# Patient Record
Sex: FEMALE | Race: WHITE | NOT HISPANIC OR LATINO | Employment: FULL TIME | URBAN - NONMETROPOLITAN AREA
[De-identification: names, ages, dates, MRNs, and addresses within clinical notes are randomized per-mention and may not be internally consistent; named-entity substitution may affect disease eponyms.]

---

## 2018-03-09 ENCOUNTER — TRANSFERRED RECORDS (OUTPATIENT)
Dept: HEALTH INFORMATION MANAGEMENT | Facility: OTHER | Age: 33
End: 2018-03-09

## 2018-03-09 LAB
HPV ABSTRACT: NORMAL
PAP-ABSTRACT: NORMAL

## 2020-05-12 ENCOUNTER — VIRTUAL VISIT (OUTPATIENT)
Dept: INTERNAL MEDICINE | Facility: OTHER | Age: 35
End: 2020-05-12
Attending: NURSE PRACTITIONER
Payer: COMMERCIAL

## 2020-05-12 VITALS — WEIGHT: 205 LBS

## 2020-05-12 DIAGNOSIS — L70.0 CYSTIC ACNE: ICD-10-CM

## 2020-05-12 DIAGNOSIS — E66.9 OBESITY WITHOUT SERIOUS COMORBIDITY, UNSPECIFIED CLASSIFICATION, UNSPECIFIED OBESITY TYPE: ICD-10-CM

## 2020-05-12 DIAGNOSIS — Z76.89 ENCOUNTER TO ESTABLISH CARE: Primary | ICD-10-CM

## 2020-05-12 DIAGNOSIS — Z76.0 ENCOUNTER FOR MEDICATION REFILL: ICD-10-CM

## 2020-05-12 PROCEDURE — 99214 OFFICE O/P EST MOD 30 MIN: CPT | Mod: 95 | Performed by: NURSE PRACTITIONER

## 2020-05-12 RX ORDER — PHENTERMINE HYDROCHLORIDE 37.5 MG/1
37.5 CAPSULE ORAL EVERY MORNING
Qty: 90 CAPSULE | Refills: 0 | Status: SHIPPED | OUTPATIENT
Start: 2020-05-12 | End: 2020-08-26

## 2020-05-12 RX ORDER — CLINDAMYCIN PHOSPHATE, BENZOYL PEROXIDE 25; 10 MG/G; MG/G
GEL TOPICAL DAILY
Qty: 50 G | Refills: 0 | Status: SHIPPED | OUTPATIENT
Start: 2020-05-12 | End: 2020-08-26

## 2020-05-12 SDOH — SOCIAL STABILITY: SOCIAL NETWORK: ARE YOU MARRIED, WIDOWED, DIVORCED, SEPARATED, NEVER MARRIED, OR LIVING WITH A PARTNER?: MARRIED

## 2020-05-12 SDOH — SOCIAL STABILITY: SOCIAL NETWORK: IN A TYPICAL WEEK, HOW MANY TIMES DO YOU TALK ON THE PHONE WITH FAMILY, FRIENDS, OR NEIGHBORS?: TWICE A WEEK

## 2020-05-12 SDOH — HEALTH STABILITY: MENTAL HEALTH: HOW MANY STANDARD DRINKS CONTAINING ALCOHOL DO YOU HAVE ON A TYPICAL DAY?: 1 OR 2

## 2020-05-12 SDOH — SOCIAL STABILITY: SOCIAL INSECURITY: WITHIN THE LAST YEAR, HAVE YOU BEEN HUMILIATED OR EMOTIONALLY ABUSED IN OTHER WAYS BY YOUR PARTNER OR EX-PARTNER?: NO

## 2020-05-12 SDOH — HEALTH STABILITY: PHYSICAL HEALTH: ON AVERAGE, HOW MANY DAYS PER WEEK DO YOU ENGAGE IN MODERATE TO STRENUOUS EXERCISE (LIKE A BRISK WALK)?: 7 DAYS

## 2020-05-12 SDOH — HEALTH STABILITY: MENTAL HEALTH: HOW OFTEN DO YOU HAVE A DRINK CONTAINING ALCOHOL?: MONTHLY OR LESS

## 2020-05-12 SDOH — SOCIAL STABILITY: SOCIAL NETWORK: HOW OFTEN DO YOU GET TOGETHER WITH FRIENDS OR RELATIVES?: TWICE A WEEK

## 2020-05-12 SDOH — HEALTH STABILITY: MENTAL HEALTH: HOW OFTEN DO YOU HAVE 6 OR MORE DRINKS ON ONE OCCASION?: NEVER

## 2020-05-12 SDOH — SOCIAL STABILITY: SOCIAL INSECURITY
WITHIN THE LAST YEAR, HAVE TO BEEN RAPED OR FORCED TO HAVE ANY KIND OF SEXUAL ACTIVITY BY YOUR PARTNER OR EX-PARTNER?: NO

## 2020-05-12 SDOH — SOCIAL STABILITY: SOCIAL NETWORK
DO YOU BELONG TO ANY CLUBS OR ORGANIZATIONS SUCH AS CHURCH GROUPS UNIONS, FRATERNAL OR ATHLETIC GROUPS, OR SCHOOL GROUPS?: NO

## 2020-05-12 SDOH — SOCIAL STABILITY: SOCIAL INSECURITY: WITHIN THE LAST YEAR, HAVE YOU BEEN AFRAID OF YOUR PARTNER OR EX-PARTNER?: NO

## 2020-05-12 SDOH — SOCIAL STABILITY: SOCIAL NETWORK: HOW OFTEN DO YOU ATTENT MEETINGS OF THE CLUB OR ORGANIZATION YOU BELONG TO?: NOT ASKED

## 2020-05-12 SDOH — SOCIAL STABILITY: SOCIAL NETWORK: HOW OFTEN DO YOU ATTEND CHURCH OR RELIGIOUS SERVICES?: 1 TO 4 TIMES PER YEAR

## 2020-05-12 SDOH — SOCIAL STABILITY: SOCIAL INSECURITY
WITHIN THE LAST YEAR, HAVE YOU BEEN KICKED, HIT, SLAPPED, OR OTHERWISE PHYSICALLY HURT BY YOUR PARTNER OR EX-PARTNER?: NO

## 2020-05-12 SDOH — HEALTH STABILITY: PHYSICAL HEALTH: ON AVERAGE, HOW MANY MINUTES DO YOU ENGAGE IN EXERCISE AT THIS LEVEL?: 30 MIN

## 2020-05-12 ASSESSMENT — PAIN SCALES - GENERAL: PAINLEVEL: NO PAIN (0)

## 2020-05-12 NOTE — PROGRESS NOTES
"Patient presents for a video visit today for medication refill.  Iris Merino LPN on 5/12/2020 at 9:02 AM          Beth Schmitz is a 35 year old female who is being evaluated via a billable video visit.      The patient has been notified of following:     \"This video visit will be conducted via a call between you and your physician/provider. We have found that certain health care needs can be provided without the need for an in-person physical exam.  This service lets us provide the care you need with a video conversation.  If a prescription is necessary we can send it directly to your pharmacy.  If lab work is needed we can place an order for that and you can then stop by our lab to have the test done at a later time.    Video visits are billed at different rates depending on your insurance coverage.  Please reach out to your insurance provider with any questions.    If during the course of the call the physician/provider feels a video visit is not appropriate, you will not be charged for this service.\"    Patient has given verbal consent for Video visit? Yes    How would you like to obtain your AVS? E-Mail (inform patient AVS not encrypted)    Patient would like the video invitation sent by: Text to cell phone: [phone]    Will anyone else be joining your video visit? No        Subjective     Beth Schmitz is a 35 year old female who presents today via video visit to establish care and for medication refill. She was a former patient of mine at another facility.    HPI  New Patient/Transfer of Care    Patient is a former patient of mine from [facility] in [place].  She wishes to establish care at [facility] with Dr. Amy Harrington and myself.  She has issues with her weight, reports her weight is now up to 205 pounds which is about 30 pounds heavier than the last time I saw her.  She has been on phentermine in the past with good results and has tolerated that without any issues.  She also has a " history of cystic acne which has been treated with topical prescription medication.  She needs refills on both of these as she has not been able to get any medication since I last saw her.  She also reports she did try the keto diet which assisted in clearing up her acne on a short-term basis and she had no weight loss after being on that diet for greater than 6 months.  She also reports she has had numerous checks of her thyroid, with her TSH level being variable.  She also reports radioactive testing.  She has never been started on thyroid replacement and she tells me she was diagnosed with thyroiditis.  She reports hair loss, weight gain, dry skin, palpitations at times, racing heart, and reports she cannot sleep stating she just feels different.  She has no chest pain no shortness of breath.  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  She has 3 kids ages 9, 10, and 13.  Her  is an  based out of [place] [facility] [place] but he travels all [place] [facility].  She is wanting to sign a release to get records from [facility] sent over here to [facility].    Video Start Time: 9:30 AM    Patient Active Problem List   Diagnosis     Encounter to establish care     Encounter for medication refill     No past surgical history on file.    Social History     Tobacco Use     Smoking status: Never Smoker     Smokeless tobacco: Never Used   Substance Use Topics     Alcohol use: Yes     Frequency: Monthly or less     Drinks per session: 1 or 2     Binge frequency: Never     No family history on file.      Reviewed and updated as needed this visit by Provider.  History updated as I was able.  Will wait for prior medical records and update her entire chart at that time.    Review of Systems   Constitutional, HEENT, cardiovascular, pulmonary, GI, , musculoskeletal, neuro, skin, endocrine and psych systems are negative, except as  otherwise noted.      Objective    There were no vitals taken for this visit.  There is no height or weight on file to calculate BMI.     Physical Exam     GENERAL: Healthy, alert and no distress  EYES: Eyes grossly normal to inspection.  No discharge or erythema, or obvious scleral/conjunctival abnormalities.  RESP: No audible wheeze, cough, or visible cyanosis.  No visible retractions or increased work of breathing.    SKIN: Visible skin clear. No significant rash, abnormal pigmentation or lesions.  NEURO: Cranial nerves grossly intact.  Mentation and speech appropriate for age.  PSYCH: Mentation appears normal, affect normal/bright, judgement and insight intact, normal speech and appearance well-groomed.    Assessment & Plan       ICD-10-CM    1. Encounter to establish care  Z76.89 Patient will contact [facility] in [place] and have her records transferred to [facility].        2. Encounter for medication refill  Z76.0  I will refill her medications today.        3. Cystic acne  L70.0 clindamycin phos-benzoyl perox (ACANYA) 1.2-2.5 % external gel;  apply to skin once a day   4. Obesity without serious comorbidity, unspecified classification, unspecified obesity type  E66.9 phentermine (ADIPEX-P) 37.5 MG capsule;   3-month supply given.  She will recheck in clinic at that time if not sooner.  She will notify clinic if she has any side effects but she has tolerated this in the past well so minimal concerns at this time        FUTURE APPOINTMENTS:       - Follow-up office or E-visit in 2 to 3 months, sooner if problems.  Did advise her that she will need labs.      Video-Visit Details    Type of service:  Video Visit    Video End Time:9:49 AM    Originating Location (pt. Location): Home    Distant Location (provider location):  [facility]     Platform used for Video Visit: VEE Martinez, AGNP-C  Internal Medicine  05/12/2020 9:23 AM

## 2020-08-20 ENCOUNTER — MYC REFILL (OUTPATIENT)
Dept: INTERNAL MEDICINE | Facility: OTHER | Age: 35
End: 2020-08-20

## 2020-08-20 DIAGNOSIS — Z76.89 ENCOUNTER TO ESTABLISH CARE: ICD-10-CM

## 2020-08-20 DIAGNOSIS — E66.9 OBESITY WITHOUT SERIOUS COMORBIDITY, UNSPECIFIED CLASSIFICATION, UNSPECIFIED OBESITY TYPE: ICD-10-CM

## 2020-08-20 DIAGNOSIS — Z76.0 ENCOUNTER FOR MEDICATION REFILL: ICD-10-CM

## 2020-08-21 NOTE — TELEPHONE ENCOUNTER
Routing refill request to provider for review/approval because:  Drug not on the [facility] refill protocol     LOV: 5/12/2020  Tiffany Márquez RN on 8/21/2020 at 1:14 PM

## 2020-08-23 RX ORDER — PHENTERMINE HYDROCHLORIDE 37.5 MG/1
37.5 CAPSULE ORAL EVERY MORNING
Qty: 90 CAPSULE | Refills: 0 | OUTPATIENT
Start: 2020-08-23

## 2020-08-25 NOTE — TELEPHONE ENCOUNTER
Patient notified. Transferred to the appointmentdesk to schedule a video visit, as she is back to work in [place] and they are asking her not to cross state lines currently. She will record her weight and blood pressure prior to the visit.  Gely Pastor CMA([facility])  ..................8/25/2020   9:57 AM

## 2020-08-26 ENCOUNTER — VIRTUAL VISIT (OUTPATIENT)
Dept: INTERNAL MEDICINE | Facility: OTHER | Age: 35
End: 2020-08-26
Attending: NURSE PRACTITIONER
Payer: COMMERCIAL

## 2020-08-26 VITALS — DIASTOLIC BLOOD PRESSURE: 81 MMHG | SYSTOLIC BLOOD PRESSURE: 121 MMHG

## 2020-08-26 DIAGNOSIS — Z76.0 ENCOUNTER FOR MEDICATION REFILL: ICD-10-CM

## 2020-08-26 DIAGNOSIS — Z76.89 ENCOUNTER TO ESTABLISH CARE: ICD-10-CM

## 2020-08-26 DIAGNOSIS — L70.0 CYSTIC ACNE: ICD-10-CM

## 2020-08-26 DIAGNOSIS — E66.9 OBESITY WITHOUT SERIOUS COMORBIDITY, UNSPECIFIED CLASSIFICATION, UNSPECIFIED OBESITY TYPE: ICD-10-CM

## 2020-08-26 PROCEDURE — 99213 OFFICE O/P EST LOW 20 MIN: CPT | Mod: 95 | Performed by: NURSE PRACTITIONER

## 2020-08-26 RX ORDER — PHENTERMINE HYDROCHLORIDE 37.5 MG/1
37.5 CAPSULE ORAL EVERY MORNING
Qty: 90 CAPSULE | Refills: 0 | Status: SHIPPED | OUTPATIENT
Start: 2020-08-26 | End: 2020-11-23

## 2020-08-26 RX ORDER — CLINDAMYCIN PHOSPHATE, BENZOYL PEROXIDE 25; 10 MG/G; MG/G
GEL TOPICAL DAILY
Qty: 50 G | Refills: 0 | Status: SHIPPED | OUTPATIENT
Start: 2020-08-26 | End: 2020-11-23

## 2020-08-26 ASSESSMENT — PAIN SCALES - GENERAL: PAINLEVEL: NO PAIN (0)

## 2020-08-26 NOTE — PROGRESS NOTES
"Beth Schmitz is a 35 year old female who is being evaluated via a billable video visit.      The patient has been notified of following:     \"This video visit will be conducted via a call between you and your physician/provider. We have found that certain health care needs can be provided without the need for an in-person physical exam.  This service lets us provide the care you need with a video conversation.  If a prescription is necessary we can send it directly to your pharmacy.  If lab work is needed we can place an order for that and you can then stop by our lab to have the test done at a later time.    Video visits are billed at different rates depending on your insurance coverage.  Please reach out to your insurance provider with any questions.    If during the course of the call the physician/provider feels a video visit is not appropriate, you will not be charged for this service.\"    Patient has given verbal consent for Video visit? Yes  How would you like to obtain your AVS? MyChart  If you are dropped from the video visit, the video invite should be resent to: Send to e-mail at: [email or web address]  Will anyone else be joining your video visit? No    Subjective     Beth Schmitz is a 35 year old female who presents today via video visit for the following health issues:    HPI    Patient is being seen today via video visit for her 3-month check regarding her clindamycin and phentermine.  She reports she was called back to work early, she is a para at an elementary school.  They have been advised they are not allowed to cross state lines at this time due to COVID.  She had plan to do this visit in person. she has been on these products in the past.  She takes her phentermine for weight loss, she reports she has lost 10 pounds in the past 3 months.  She reports she had been doing the keto diet at prior visit and her cystic acne had cleared up but over the past 3 months she is slowly started to " reintroduce carbs into her diet and her cystic acne has now started to return along her jawline.  She is going to restart the low-carb diet and see if this helps but is wanting to get a refill of both meds at this time.  She is also questioning whether her medical records got sent to [facility] from [facility] as she had prior requested.  She said she called and then was told there are no records that I had seen her in the past.  She is also unable to view any appointments from [facility] on her E chart.    Video Start Time: 1112    Review of Systems   CONSTITUTIONAL: NEGATIVE for fever, chills, change in weight  INTEGUMENTARY/SKIN: POSITIVE for acne   ENT/MOUTH: NEGATIVE for ear, mouth and throat problems  RESP: NEGATIVE for significant cough or SOB  CV: NEGATIVE for chest pain, palpitations or peripheral edema      Objective    Vitals - Patient Reported  Pain Score: No Pain (0)    Physical Exam     GENERAL: Healthy, alert and no distress  EYES: Eyes grossly normal to inspection.  No discharge or erythema, or obvious scleral/conjunctival abnormalities.  RESP: No audible wheeze, cough, or visible cyanosis.  No visible retractions or increased work of breathing.    SKIN:  No significant rash, abnormal pigmentation or lesions. does have acne along bilateral jawline  NEURO: Cranial nerves grossly intact.  Mentation and speech appropriate for age.  PSYCH: Mentation appears normal, affect normal/bright, judgement and insight intact, normal speech and appearance well-groomed.    Encounter for medication refill  - clindamycin phos-benzoyl perox (ACANYA) 1.2-2.5 % external gel; Apply topically daily  Dispense: 50 g; Refill: 0  - phentermine (ADIPEX-P) 37.5 MG capsule; Take 1 capsule (37.5 mg) by mouth every morning  Dispense: 90 capsule; Refill: 0    3. Cystic acne  - clindamycin phos-benzoyl perox (ACANYA) 1.2-2.5 % external gel; Apply topically daily  Dispense: 50 g; Refill: 0    4. Obesity without serious comorbidity,  unspecified classification, unspecified obesity type  - phentermine (ADIPEX-P) 37.5 MG capsule; Take 1 capsule (37.5 mg) by mouth every morning  Dispense: 90 capsule; Refill: 0    She will attempt to make a 3-month follow-up appointment in person however due to her work restrictions with COVID, she may end up having to do another video visit.    Video-Visit Details    Type of service:  Video Visit    Video End Time:1123    Originating Location (pt. Location): Home    Distant Location (provider location):  [facility]     Platform used for Video Visit: VEE Gregory, AGNP-C  Internal Medicine  08/26/2020 11:39 AM

## 2020-08-26 NOTE — NURSING NOTE
No chief complaint on file.      Initial /81 (BP Location: Right arm, Patient Position: Sitting)   LMP 08/16/2020 (Approximate)   Breastfeeding No  There is no height or weight on file to calculate BMI.  Medication Reconciliation: Completed     Bernardo Cartagena LPN

## 2020-11-23 ENCOUNTER — MYC REFILL (OUTPATIENT)
Dept: INTERNAL MEDICINE | Facility: OTHER | Age: 35
End: 2020-11-23

## 2020-11-23 DIAGNOSIS — Z76.89 ENCOUNTER TO ESTABLISH CARE: ICD-10-CM

## 2020-11-23 DIAGNOSIS — E66.9 OBESITY WITHOUT SERIOUS COMORBIDITY, UNSPECIFIED CLASSIFICATION, UNSPECIFIED OBESITY TYPE: ICD-10-CM

## 2020-11-23 DIAGNOSIS — L70.0 CYSTIC ACNE: ICD-10-CM

## 2020-11-23 DIAGNOSIS — Z76.0 ENCOUNTER FOR MEDICATION REFILL: ICD-10-CM

## 2020-11-24 RX ORDER — PHENTERMINE HYDROCHLORIDE 37.5 MG/1
CAPSULE ORAL
Qty: 90 CAPSULE | OUTPATIENT
Start: 2020-11-24

## 2020-11-24 RX ORDER — CLINDAMYCIN PHOSPHATE, BENZOYL PEROXIDE 25; 10 MG/G; MG/G
GEL TOPICAL DAILY
Qty: 50 G | Refills: 0 | Status: SHIPPED | OUTPATIENT
Start: 2020-11-24 | End: 2021-02-21

## 2020-11-24 RX ORDER — PHENTERMINE HYDROCHLORIDE 37.5 MG/1
37.5 CAPSULE ORAL EVERY MORNING
Qty: 90 CAPSULE | Refills: 0 | Status: SHIPPED | OUTPATIENT
Start: 2020-11-24 | End: 2021-02-21

## 2021-01-04 ENCOUNTER — HEALTH MAINTENANCE LETTER (OUTPATIENT)
Age: 36
End: 2021-01-04

## 2021-02-11 ENCOUNTER — TELEPHONE (OUTPATIENT)
Dept: INTERNAL MEDICINE | Facility: OTHER | Age: 36
End: 2021-02-11

## 2021-02-11 NOTE — TELEPHONE ENCOUNTER
Left message for patient to return call. Patient needs an in person visit as she lives out of [place], and she needs to make sure her insurance will cover it.  Iris Merino LPN on 2/11/2021 at 9:32 AM'

## 2021-02-21 ENCOUNTER — MYC REFILL (OUTPATIENT)
Dept: INTERNAL MEDICINE | Facility: OTHER | Age: 36
End: 2021-02-21

## 2021-02-21 DIAGNOSIS — L70.0 CYSTIC ACNE: ICD-10-CM

## 2021-02-21 DIAGNOSIS — E66.9 OBESITY WITHOUT SERIOUS COMORBIDITY, UNSPECIFIED CLASSIFICATION, UNSPECIFIED OBESITY TYPE: ICD-10-CM

## 2021-02-21 DIAGNOSIS — Z76.89 ENCOUNTER TO ESTABLISH CARE: ICD-10-CM

## 2021-02-21 DIAGNOSIS — Z76.0 ENCOUNTER FOR MEDICATION REFILL: ICD-10-CM

## 2021-02-23 ENCOUNTER — MYC MEDICAL ADVICE (OUTPATIENT)
Dept: INTERNAL MEDICINE | Facility: OTHER | Age: 36
End: 2021-02-23

## 2021-02-23 RX ORDER — PHENTERMINE HYDROCHLORIDE 37.5 MG/1
37.5 CAPSULE ORAL EVERY MORNING
Qty: 61 CAPSULE | Refills: 0 | Status: SHIPPED | OUTPATIENT
Start: 2021-02-23 | End: 2021-04-25

## 2021-02-23 RX ORDER — CLINDAMYCIN PHOSPHATE, BENZOYL PEROXIDE 25; 10 MG/G; MG/G
GEL TOPICAL DAILY
Qty: 50 G | Refills: 0 | Status: SHIPPED | OUTPATIENT
Start: 2021-02-23 | End: 2021-04-28

## 2021-02-23 NOTE — TELEPHONE ENCOUNTER
Routing refill request to provider for review/approval because:  Drug not on the [facility] refill protocol     LOV: 8/26/2020    Tiffany Márquez RN on 2/23/2021 at 11:25 AM

## 2021-02-25 ENCOUNTER — TELEPHONE (OUTPATIENT)
Dept: INTERNAL MEDICINE | Facility: OTHER | Age: 36
End: 2021-02-25

## 2021-02-25 NOTE — TELEPHONE ENCOUNTER
"I submitted prior authorization request via cover my meds for phentermine (ADIPEX-P) 37.5 MG capsule and per cover my meds \"drug is not covered by plan.\" do you want to prescribe something else for the patient?   "

## 2021-02-25 NOTE — TELEPHONE ENCOUNTER
This medication is never covered.  If she would like it, she needs to buy it with self pay.  Thanks.

## 2021-02-25 NOTE — TELEPHONE ENCOUNTER
Left message to call back.  Gely Pastor CMA([facility]) ....................  2/25/2021   3:02 PM

## 2021-04-28 ENCOUNTER — OFFICE VISIT (OUTPATIENT)
Dept: INTERNAL MEDICINE | Facility: OTHER | Age: 36
End: 2021-04-28
Attending: NURSE PRACTITIONER
Payer: COMMERCIAL

## 2021-04-28 VITALS
WEIGHT: 194.5 LBS | HEART RATE: 95 BPM | DIASTOLIC BLOOD PRESSURE: 80 MMHG | RESPIRATION RATE: 16 BRPM | HEIGHT: 67 IN | BODY MASS INDEX: 30.53 KG/M2 | OXYGEN SATURATION: 100 % | SYSTOLIC BLOOD PRESSURE: 128 MMHG | TEMPERATURE: 97.5 F

## 2021-04-28 DIAGNOSIS — Z13.220 SCREENING FOR HYPERLIPIDEMIA: ICD-10-CM

## 2021-04-28 DIAGNOSIS — Z13.29 SCREENING FOR THYROID DISORDER: ICD-10-CM

## 2021-04-28 DIAGNOSIS — Z76.89 ENCOUNTER TO ESTABLISH CARE: ICD-10-CM

## 2021-04-28 DIAGNOSIS — Z00.00 ENCOUNTER FOR ANNUAL PHYSICAL EXAM: Primary | ICD-10-CM

## 2021-04-28 DIAGNOSIS — Z13.1 SCREENING FOR DIABETES MELLITUS: ICD-10-CM

## 2021-04-28 DIAGNOSIS — Z76.0 ENCOUNTER FOR MEDICATION REFILL: ICD-10-CM

## 2021-04-28 DIAGNOSIS — L70.0 CYSTIC ACNE: ICD-10-CM

## 2021-04-28 DIAGNOSIS — Z11.59 ENCOUNTER FOR HEPATITIS C SCREENING TEST FOR LOW RISK PATIENT: ICD-10-CM

## 2021-04-28 DIAGNOSIS — E66.9 OBESITY WITHOUT SERIOUS COMORBIDITY, UNSPECIFIED CLASSIFICATION, UNSPECIFIED OBESITY TYPE: Chronic | ICD-10-CM

## 2021-04-28 DIAGNOSIS — L30.9 ECZEMA, UNSPECIFIED TYPE: ICD-10-CM

## 2021-04-28 LAB
ALBUMIN SERPL-MCNC: 4.6 G/DL (ref 3.5–5.7)
ALP SERPL-CCNC: 60 U/L (ref 34–104)
ALT SERPL W P-5'-P-CCNC: 9 U/L (ref 7–52)
ANION GAP SERPL CALCULATED.3IONS-SCNC: 8 MMOL/L (ref 3–14)
AST SERPL W P-5'-P-CCNC: 14 U/L (ref 13–39)
BASOPHILS # BLD AUTO: 0 10E9/L (ref 0–0.2)
BASOPHILS NFR BLD AUTO: 0.6 %
BILIRUB SERPL-MCNC: 0.6 MG/DL (ref 0.3–1)
BUN SERPL-MCNC: 12 MG/DL (ref 7–25)
CALCIUM SERPL-MCNC: 9.6 MG/DL (ref 8.6–10.3)
CHLORIDE SERPL-SCNC: 103 MMOL/L (ref 98–107)
CHOLEST SERPL-MCNC: 166 MG/DL
CO2 SERPL-SCNC: 26 MMOL/L (ref 21–31)
CREAT SERPL-MCNC: 0.81 MG/DL (ref 0.6–1.2)
DIFFERENTIAL METHOD BLD: ABNORMAL
EOSINOPHIL # BLD AUTO: 0.1 10E9/L (ref 0–0.7)
EOSINOPHIL NFR BLD AUTO: 1.1 %
ERYTHROCYTE [DISTWIDTH] IN BLOOD BY AUTOMATED COUNT: 12.8 % (ref 10–15)
GFR SERPL CREATININE-BSD FRML MDRD: 80 ML/MIN/{1.73_M2}
GLUCOSE SERPL-MCNC: 93 MG/DL (ref 70–105)
HBA1C MFR BLD: 4.9 % (ref 4–6)
HCT VFR BLD AUTO: 38.6 % (ref 35–47)
HDLC SERPL-MCNC: 72 MG/DL (ref 23–92)
HGB BLD-MCNC: 13.7 G/DL (ref 11.7–15.7)
IMM GRANULOCYTES # BLD: 0 10E9/L (ref 0–0.4)
IMM GRANULOCYTES NFR BLD: 0.2 %
LDLC SERPL CALC-MCNC: 81 MG/DL
LYMPHOCYTES # BLD AUTO: 1 10E9/L (ref 0.8–5.3)
LYMPHOCYTES NFR BLD AUTO: 21.7 %
MCH RBC QN AUTO: 34.5 PG (ref 26.5–33)
MCHC RBC AUTO-ENTMCNC: 35.5 G/DL (ref 31.5–36.5)
MCV RBC AUTO: 97 FL (ref 78–100)
MONOCYTES # BLD AUTO: 0.4 10E9/L (ref 0–1.3)
MONOCYTES NFR BLD AUTO: 8.1 %
NEUTROPHILS # BLD AUTO: 3.2 10E9/L (ref 1.6–8.3)
NEUTROPHILS NFR BLD AUTO: 68.3 %
NONHDLC SERPL-MCNC: 94 MG/DL
PLATELET # BLD AUTO: 269 10E9/L (ref 150–450)
POTASSIUM SERPL-SCNC: 4.3 MMOL/L (ref 3.5–5.1)
PROT SERPL-MCNC: 7.4 G/DL (ref 6.4–8.9)
RBC # BLD AUTO: 3.97 10E12/L (ref 3.8–5.2)
SODIUM SERPL-SCNC: 137 MMOL/L (ref 134–144)
TRIGL SERPL-MCNC: 65 MG/DL
TSH SERPL DL<=0.05 MIU/L-ACNC: 1.68 IU/ML (ref 0.34–5.6)
WBC # BLD AUTO: 4.7 10E9/L (ref 4–11)

## 2021-04-28 PROCEDURE — 36415 COLL VENOUS BLD VENIPUNCTURE: CPT | Mod: ZL | Performed by: NURSE PRACTITIONER

## 2021-04-28 PROCEDURE — 84443 ASSAY THYROID STIM HORMONE: CPT | Mod: ZL | Performed by: NURSE PRACTITIONER

## 2021-04-28 PROCEDURE — 80053 COMPREHEN METABOLIC PANEL: CPT | Mod: ZL | Performed by: NURSE PRACTITIONER

## 2021-04-28 PROCEDURE — 83036 HEMOGLOBIN GLYCOSYLATED A1C: CPT | Mod: ZL | Performed by: NURSE PRACTITIONER

## 2021-04-28 PROCEDURE — 99395 PREV VISIT EST AGE 18-39: CPT | Performed by: NURSE PRACTITIONER

## 2021-04-28 PROCEDURE — 80061 LIPID PANEL: CPT | Mod: ZL | Performed by: NURSE PRACTITIONER

## 2021-04-28 PROCEDURE — 86803 HEPATITIS C AB TEST: CPT | Mod: ZL | Performed by: NURSE PRACTITIONER

## 2021-04-28 PROCEDURE — 85025 COMPLETE CBC W/AUTO DIFF WBC: CPT | Mod: ZL | Performed by: NURSE PRACTITIONER

## 2021-04-28 RX ORDER — PHENTERMINE HYDROCHLORIDE 37.5 MG/1
37.5 CAPSULE ORAL EVERY MORNING
Qty: 90 CAPSULE | Refills: 0 | Status: SHIPPED | OUTPATIENT
Start: 2021-04-28 | End: 2021-08-02

## 2021-04-28 RX ORDER — PHENTERMINE HYDROCHLORIDE 37.5 MG/1
37.5 CAPSULE ORAL EVERY MORNING
COMMUNITY
End: 2021-04-28

## 2021-04-28 RX ORDER — CLOBETASOL PROPIONATE 0.5 MG/G
OINTMENT TOPICAL 2 TIMES DAILY
Qty: 60 G | Refills: 4 | Status: SHIPPED | OUTPATIENT
Start: 2021-04-28 | End: 2024-08-16

## 2021-04-28 RX ORDER — CLINDAMYCIN PHOSPHATE, BENZOYL PEROXIDE 25; 10 MG/G; MG/G
GEL TOPICAL DAILY
Qty: 50 G | Refills: 0 | Status: SHIPPED | OUTPATIENT
Start: 2021-04-28 | End: 2021-07-28

## 2021-04-28 ASSESSMENT — MIFFLIN-ST. JEOR: SCORE: 1601.94

## 2021-04-28 ASSESSMENT — PAIN SCALES - GENERAL: PAINLEVEL: NO PAIN (0)

## 2021-04-28 NOTE — NURSING NOTE
Chief Complaint   Patient presents with     Physical     Patient presents to the clinic today for a physical,  Patient has an area on right ankle that wood a rash/eczema would like provider to look at    Medication Reconciliation: completed   Iris Merino LPN  4/28/2021 10:19 AM

## 2021-04-28 NOTE — PROGRESS NOTES
Beth Schmitz  : 1985 Age: 36 year old Sex: female MRN: [id number]    CC:   Chief Complaint   Patient presents with     Physical      LYNN Score:    No flowsheet data found.     PHQ-2 Score:     PHQ-2 (  [employer]) 2021   Q1: Little interest or pleasure in doing things 0 0   Q2: Feeling down, depressed or hopeless 0 0   PHQ-2 Score 0 0         Tobacco Use      Smoking status: Never Smoker      Smokeless tobacco: Never Used    NURSE'S NOTES:    Nursing Notes:   Iris Merino LPN  2021 10:36 AM  Signed  Chief Complaint   Patient presents with     Physical     Patient presents to the clinic today for a physical,  Patient has an area on right ankle that wood a rash/eczema would like provider to look at    Medication Reconciliation: completed   Iris Merino LPN  2021 10:19 AM     Nursing note reviewed with patient.  Accuracy and completeness verified.        SUBJECTIVE:                                                      HPI:   Presents for her annual physical. She has questions about the COVID vaccine. She is due for annual screenings. PAP was done two years ago.     Beth Schmitz also presents with:  Past Medical History:   Diagnosis Date     Cystic acne      Obesity      Thyroiditis     per [facility] at [facility]     Cystic Acne treated with clindamycin gel. She watches her sugar intake, lactose intake which does improve acne. She has tried KETO diet and acne improved. Acne also improves during the summer. She has tried various OTC products.    Weight: she has used phentermine off/on for many years, reports no adverse side effects.    She does report a reddened flaky, itchy area on the anterior portion of her right shin. Has tried triamcinolone cream with no relief.  Her mom has eczema.  Winter makes it worse.    Current Code Status:  No Order    REVIEW OF SYSTEMS:    Review of Systems   Skin: Positive for color change and rash.        Right shin    Cystic acne   All other systems  "reviewed and are negative.      Problem List/PMH: Reviewed in EMR, and made relevant updates today.  Medications: Reviewed in EMR, and made relevant updates today.  Allergies: Reviewed in EMR, and made relevant updates today.    OBJECTIVE:                                                      /80 (BP Location: Right arm, Patient Position: Sitting, Cuff Size: Adult Regular)   Pulse 95   Temp 97.5  F (36.4  C) (Tympanic)   Resp 16   Ht 1.689 m (5' 6.5\")   Wt 88.2 kg (194 lb 8 oz)   LMP 04/26/2021 (Approximate)   SpO2 100%   BMI 30.92 kg/m      Current Pain Score:   No Pain (0)     Physical Exam  Vitals signs and nursing note reviewed.   Constitutional:       Appearance: Normal appearance. She is obese.   HENT:      Head: Normocephalic and atraumatic.      Nose: Nose normal.      Mouth/Throat:      Mouth: Mucous membranes are moist.      Pharynx: Oropharynx is clear.   Eyes:      Extraocular Movements: Extraocular movements intact.      Conjunctiva/sclera: Conjunctivae normal.      Pupils: Pupils are equal, round, and reactive to light.   Cardiovascular:      Rate and Rhythm: Normal rate and regular rhythm.      Pulses: Normal pulses.      Heart sounds: Normal heart sounds.   Pulmonary:      Effort: Pulmonary effort is normal.      Breath sounds: Normal breath sounds.   Abdominal:      General: Bowel sounds are normal.      Palpations: Abdomen is soft.   Musculoskeletal: Normal range of motion.   Skin:     General: Skin is warm and dry.      Findings: Rash (right anterior shin) present. Rash is urticarial.          Neurological:      Mental Status: She is alert and oriented to person, place, and time. Mental status is at baseline.   Psychiatric:         Mood and Affect: Mood normal.         Behavior: Behavior normal.         Thought Content: Thought content normal.         Judgment: Judgment normal.       BP Readings from Last 3 Encounters:   08/26/20 121/81     Vitals:    04/28/21 1021   Weight: 88.2 kg " (194 lb 8 oz)        The ASCVD Risk score (Shannan ARDON Jr., et al., 2013) failed to calculate for the following reasons:    The 2013 ASCVD risk score is only valid for ages 40 to 79    Diagnostics Completed at this Visit:    Results for orders placed or performed in visit on 04/28/21   Hepatitis C antibody     Status: None   Result Value Ref Range    Hepatitis C Antibody Nonreactive NR^Nonreactive   Hemoglobin A1c     Status: None   Result Value Ref Range    Hemoglobin A1C 4.9 4.0 - 6.0 %   Lipid Profile     Status: None   Result Value Ref Range    Cholesterol 166 <200 mg/dL    Triglycerides 65 <150 mg/dL    HDL Cholesterol 72 23 - 92 mg/dL    LDL Cholesterol Calculated 81 <100 mg/dL    Non HDL Cholesterol 94 <130 mg/dL   Thyrotropin GH     Status: None   Result Value Ref Range    Thyrotropin 1.68 0.34 - 5.60 IU/mL   Comprehensive metabolic panel     Status: None   Result Value Ref Range    Sodium 137 134 - 144 mmol/L    Potassium 4.3 3.5 - 5.1 mmol/L    Chloride 103 98 - 107 mmol/L    Carbon Dioxide 26 21 - 31 mmol/L    Anion Gap 8 3 - 14 mmol/L    Glucose 93 70 - 105 mg/dL    Urea Nitrogen 12 7 - 25 mg/dL    Creatinine 0.81 0.60 - 1.20 mg/dL    GFR Estimate 80 >60 mL/min/[1.73_m2]    GFR Estimate If Black >90 >60 mL/min/[1.73_m2]    Calcium 9.6 8.6 - 10.3 mg/dL    Bilirubin Total 0.6 0.3 - 1.0 mg/dL    Albumin 4.6 3.5 - 5.7 g/dL    Protein Total 7.4 6.4 - 8.9 g/dL    Alkaline Phosphatase 60 34 - 104 U/L    ALT 9 7 - 52 U/L    AST 14 13 - 39 U/L   CBC with platelets differential     Status: Abnormal   Result Value Ref Range    WBC 4.7 4.0 - 11.0 10e9/L    RBC Count 3.97 3.8 - 5.2 10e12/L    Hemoglobin 13.7 11.7 - 15.7 g/dL    Hematocrit 38.6 35.0 - 47.0 %    MCV 97 78 - 100 fl    MCH 34.5 (H) 26.5 - 33.0 pg    MCHC 35.5 31.5 - 36.5 g/dL    RDW 12.8 10.0 - 15.0 %    Platelet Count 269 150 - 450 10e9/L    Diff Method Automated Method     % Neutrophils 68.3 %    % Lymphocytes 21.7 %    % Monocytes 8.1 %    % Eosinophils  "1.1 %    % Basophils 0.6 %    % Immature Granulocytes 0.2 %    Absolute Neutrophil 3.2 1.6 - 8.3 10e9/L    Absolute Lymphocytes 1.0 0.8 - 5.3 10e9/L    Absolute Monocytes 0.4 0.0 - 1.3 10e9/L    Absolute Eosinophils 0.1 0.0 - 0.7 10e9/L    Absolute Basophils 0.0 0.0 - 0.2 10e9/L    Abs Immature Granulocytes 0.0 0 - 0.4 10e9/L        ASSESSMENT AND PLAN:    Encounter for annual physical exam  Reviewed recommended screenings and ordered as appropriate. Recommended she get COVID vaccine. She will set up with [employer] to get there.    Cystic acne  Continue clindamycin gel.    Encounter for medication refill  Med refills as appropriate.    Obesity without serious comorbidity, unspecified classification, unspecified obesity type  Continue phentermine. Encouraged to take \"breaks\" from it, continue to work on diet improvement and increased exercise to facilitate weight loss.     Rash  Will try clobetasol cream to see if this alleviates her symptoms Discussed that if it does not resolve, we can do skin biopsy and/or referral to Derm.    I explained my diagnostic considerations and recommendations to the patient, who voiced understanding and agreement with the treatment plan. All questions were answered. We discussed potential side effects of any prescribed or recommended therapies, as well as expectations for response to treatments.    Patient was advised to allow up to 2 days for response on lab results via MyChart and or letter to be sent.    FOLLOW-UP:    Return in about 1 year (around 4/28/2022) for Physical Exam, Lab Work.     Clinic : [phone]  Appointment line: [phone]     VEE Novoa, AGNP-C  Internal Medicine  04/30/2021 1:03 PM  __________________________________________________________________________________________________________________________________    Total time spent with this patient was 35 minutes which included chart review, visualization and interpretation of labs and/or " images, time spent with patient, and documentation.

## 2021-04-29 LAB — HCV AB SERPL QL IA: NONREACTIVE

## 2021-04-30 ASSESSMENT — ENCOUNTER SYMPTOMS: COLOR CHANGE: 1

## 2021-07-28 DIAGNOSIS — Z76.89 ENCOUNTER TO ESTABLISH CARE: ICD-10-CM

## 2021-07-28 DIAGNOSIS — Z76.0 ENCOUNTER FOR MEDICATION REFILL: ICD-10-CM

## 2021-07-28 DIAGNOSIS — L70.0 CYSTIC ACNE: ICD-10-CM

## 2021-07-28 DIAGNOSIS — E66.9 OBESITY WITHOUT SERIOUS COMORBIDITY, UNSPECIFIED CLASSIFICATION, UNSPECIFIED OBESITY TYPE: Chronic | ICD-10-CM

## 2021-07-28 NOTE — TELEPHONE ENCOUNTER
Routing refill request to provider for review/approval because:  Drug not on the [facility] refill protocol     LOV; 4/28/2021  Tiffany Márquez RN on 7/28/2021 at 3:12 PM

## 2021-08-02 ENCOUNTER — MYC REFILL (OUTPATIENT)
Dept: INTERNAL MEDICINE | Facility: OTHER | Age: 36
End: 2021-08-02

## 2021-08-02 DIAGNOSIS — E66.9 OBESITY WITHOUT SERIOUS COMORBIDITY, UNSPECIFIED CLASSIFICATION, UNSPECIFIED OBESITY TYPE: Chronic | ICD-10-CM

## 2021-08-02 RX ORDER — PHENTERMINE HYDROCHLORIDE 37.5 MG/1
CAPSULE ORAL
Qty: 90 CAPSULE | Refills: 3 | OUTPATIENT
Start: 2021-08-02

## 2021-08-02 RX ORDER — CLINDAMYCIN PHOSPHATE, BENZOYL PEROXIDE 25; 10 MG/G; MG/G
GEL TOPICAL
Qty: 50 G | Refills: 3 | Status: SHIPPED | OUTPATIENT
Start: 2021-08-02 | End: 2021-11-22

## 2021-08-03 NOTE — TELEPHONE ENCOUNTER
Patient requires additional visits for phentermine given the controlled nature of this.  She should schedule a in person or video visit with myself or VEE Novoa.

## 2021-08-04 NOTE — TELEPHONE ENCOUNTER
Called and spoke to Patient after verifying last name and date of birth. She was notified of Dr. Harrington's note, and transferred to scheduling line, to set up appointment. Writer will close encounter at this time. Josi Myles RN .............. 8/4/2021  9:36 AM

## 2021-08-06 ENCOUNTER — MYC MEDICAL ADVICE (OUTPATIENT)
Dept: INTERNAL MEDICINE | Facility: OTHER | Age: 36
End: 2021-08-06

## 2021-08-06 RX ORDER — PHENTERMINE HYDROCHLORIDE 37.5 MG/1
37.5 CAPSULE ORAL EVERY MORNING
Qty: 90 CAPSULE | Refills: 0 | Status: SHIPPED | OUTPATIENT
Start: 2021-08-06 | End: 2021-11-04

## 2021-08-10 NOTE — TELEPHONE ENCOUNTER
Please call the patient and tell her we are going to schedule her with Dr. Harrington.  This will be for her 3-month follow-up on her phentermine and to have her mole looked at.  Sorry for all the confusion regarding this appointment.    Offer her September 24th at 11:20 or 12 noon.

## 2021-08-10 NOTE — TELEPHONE ENCOUNTER
Called patient to inform her of openings for an appointment.  No answer, left message to call back.  Whitney Castro LPN on 8/10/2021 at 8:52 AM

## 2021-08-12 NOTE — TELEPHONE ENCOUNTER
Attempted to return patient call; no answer, left message to call back.  Whitney Castro LPN on 8/12/2021 at 2:11 PM

## 2021-10-10 ENCOUNTER — HEALTH MAINTENANCE LETTER (OUTPATIENT)
Age: 36
End: 2021-10-10

## 2021-11-22 ENCOUNTER — OFFICE VISIT (OUTPATIENT)
Dept: INTERNAL MEDICINE | Facility: OTHER | Age: 36
End: 2021-11-22
Attending: INTERNAL MEDICINE
Payer: COMMERCIAL

## 2021-11-22 VITALS
RESPIRATION RATE: 16 BRPM | SYSTOLIC BLOOD PRESSURE: 118 MMHG | WEIGHT: 189 LBS | DIASTOLIC BLOOD PRESSURE: 86 MMHG | BODY MASS INDEX: 30.05 KG/M2 | OXYGEN SATURATION: 100 % | TEMPERATURE: 96.9 F | HEART RATE: 100 BPM

## 2021-11-22 DIAGNOSIS — L70.0 CYSTIC ACNE: ICD-10-CM

## 2021-11-22 DIAGNOSIS — E66.9 OBESITY WITHOUT SERIOUS COMORBIDITY, UNSPECIFIED CLASSIFICATION, UNSPECIFIED OBESITY TYPE: Primary | Chronic | ICD-10-CM

## 2021-11-22 DIAGNOSIS — R51.9 NONINTRACTABLE HEADACHE, UNSPECIFIED CHRONICITY PATTERN, UNSPECIFIED HEADACHE TYPE: ICD-10-CM

## 2021-11-22 PROBLEM — Z76.89 ENCOUNTER TO ESTABLISH CARE: Status: RESOLVED | Noted: 2020-05-12 | Resolved: 2021-11-22

## 2021-11-22 PROBLEM — Z76.0 ENCOUNTER FOR MEDICATION REFILL: Status: RESOLVED | Noted: 2020-05-12 | Resolved: 2021-11-22

## 2021-11-22 PROCEDURE — 99214 OFFICE O/P EST MOD 30 MIN: CPT | Performed by: INTERNAL MEDICINE

## 2021-11-22 RX ORDER — CLINDAMYCIN PHOSPHATE, BENZOYL PEROXIDE 25; 10 MG/G; MG/G
GEL TOPICAL 2 TIMES DAILY PRN
Qty: 50 G | Refills: 3 | Status: SHIPPED | OUTPATIENT
Start: 2021-11-22 | End: 2023-06-29

## 2021-11-22 RX ORDER — PHENTERMINE HYDROCHLORIDE 37.5 MG/1
37.5 CAPSULE ORAL EVERY MORNING
Qty: 90 CAPSULE | Refills: 0 | Status: SHIPPED | OUTPATIENT
Start: 2021-11-22 | End: 2022-04-04

## 2021-11-22 NOTE — NURSING NOTE
Patient presents to clinic today for medication review and refills.  LMP - approximately   Medication reconciliation completed.    ACP on file? No, form previously provided.    FOOD SECURITY SCREENING QUESTIONS  Hunger Vital Signs:  Within the past 12 months we worried whether our food would run out before we got money to buy more. Never  Within the past 12 months the food we bought just didn't last and we didn't have money to get more. Never    Gely Pastor CMA([facility])..................11/22/2021   9:03 AM

## 2021-11-22 NOTE — PROGRESS NOTES
"Chief Complaint   Patient presents with     Recheck Medication         HPI: Ms. Schmitz is a 36 year old female who presents today for follow up of weight.    She has been working on weight loss.  He is using phentermine.  She denies any side effects including sleep issues with this.  She is down about 20 lbs with the phentermine.  Her weight goal is 170 lbs.      She has had a headache that started at night and worse with going to bed.  She had emesis with it.  This was 1 week after having COVID in Oct 2021 after which she had felt better.  She denies any weakness, vision changes or other concerns.    She has a history of acne.  She does use clindamycin topical for this.  She has tolerated well and only uses it intermittently in select areas.    She  reports that she has never smoked. She has never used smokeless tobacco.    Past medical history reviewed as below:     Past Medical History:   Diagnosis Date     Cystic acne      Obesity      Thyroiditis     per [facility] at [facility]   .      ROS  Pertinent ROS was performed and was negative, including for fever, chills. No other concerns, with exception of HPI above.      EXAM:   /86 (BP Location: Right arm, Patient Position: Sitting, Cuff Size: Adult Regular)   Pulse 100   Temp 96.9  F (36.1  C) (Tympanic)   Resp 16   Wt 85.7 kg (189 lb)   LMP 10/28/2021 (Approximate)   SpO2 100%   BMI 30.05 kg/m      Estimated body mass index is 30.05 kg/m  as calculated from the following:    Height as of 4/28/21: 1.689 m (5' 6.5\").    Weight as of this encounter: 85.7 kg (189 lb).      GEN: Vitals reviewed. Healthy appearing. Patient is in no acute distress. Cooperative with exam.  HEENT: Normocephalic atraumatic.  Eyes grossly normal to inspection.  No discharge or erythema, or obvious scleral/conjunctival abnormalities.  LUNGS: No audible wheeze, cough, or visible cyanosis.  No visible retractions or increased work of breathing.   SKIN: Warm and dry to touch.  Visible skin " clear. No significant rash, abnormal pigmentation or lesions.  PSYCH: Mood is good.  Mentation appears normal, affect normal/bright, judgement and insight intact, normal speech and appearance well-groomed.     ASSESSMENT AND PLAN:    Obesity without serious comorbidity, unspecified classification, unspecified obesity type  We discussed the pros and cons of continued phentermine.  She was informed that typically we would do pulse dosing with this.  She will continue at this time however the springtime will take some time off of it.  She is to work on diet control and exercise in order to maintain weight in between doses.  She will be seen in the spring for follow-up and can be renewed at that time.  - phentermine (ADIPEX-P) 37.5 MG capsule  Dispense: 90 capsule; Refill: 0    Cystic acne  She was encouraged to use this once daily and only sparingly twice daily  - clindamycin phos-benzoyl perox (ACANYA) 1.2-2.5 % external gel  Dispense: 50 g; Refill: 3    Nonintractable headache, unspecified chronicity pattern, unspecified headache type  At this time given that she has had complete resolution of her symptoms and on for several weeks we will continue to monitor however she was encouraged to be seen should she have recurrence    She will send us the dates of her Covid vaccine.    Return in about 5 months (around 4/22/2022).      WOLFGANG TATE, DO   11/22/2021 9:16 AM

## 2022-04-04 DIAGNOSIS — E66.9 OBESITY WITHOUT SERIOUS COMORBIDITY, UNSPECIFIED CLASSIFICATION, UNSPECIFIED OBESITY TYPE: Chronic | ICD-10-CM

## 2022-04-04 RX ORDER — PHENTERMINE HYDROCHLORIDE 37.5 MG/1
37.5 CAPSULE ORAL EVERY MORNING
Qty: 30 CAPSULE | Refills: 0 | Status: SHIPPED | OUTPATIENT
Start: 2022-04-04 | End: 2022-06-01

## 2022-05-25 DIAGNOSIS — E66.9 OBESITY WITHOUT SERIOUS COMORBIDITY, UNSPECIFIED CLASSIFICATION, UNSPECIFIED OBESITY TYPE: Chronic | ICD-10-CM

## 2022-05-27 NOTE — TELEPHONE ENCOUNTER
Routed to pcp as directed.  Nelia Benedict RN on 5/27/2022 at 11:29 AM    Last Prescription Date: 4/4/22  Last Qty/Refills: 30 / 0  Last Office Visit: 11/22/21  Future Office Visit: None     Requested Prescriptions   Pending Prescriptions Disp Refills     phentermine (ADIPEX-P) 37.5 MG capsule [Pharmacy Med Name: PHENTERMINE 37.5MG CAPSULES] 90 capsule      Sig: TAKE 1 CAPSULE(37.5 MG) BY MOUTH EVERY MORNING       There is no refill protocol information for this order

## 2022-05-30 RX ORDER — PHENTERMINE HYDROCHLORIDE 37.5 MG/1
CAPSULE ORAL
Qty: 90 CAPSULE | OUTPATIENT
Start: 2022-05-30

## 2022-06-01 ENCOUNTER — MYC MEDICAL ADVICE (OUTPATIENT)
Dept: INTERNAL MEDICINE | Facility: OTHER | Age: 37
End: 2022-06-01
Payer: COMMERCIAL

## 2022-06-01 DIAGNOSIS — E66.9 OBESITY WITHOUT SERIOUS COMORBIDITY, UNSPECIFIED CLASSIFICATION, UNSPECIFIED OBESITY TYPE: Chronic | ICD-10-CM

## 2022-06-01 RX ORDER — PHENTERMINE HYDROCHLORIDE 37.5 MG/1
37.5 CAPSULE ORAL EVERY MORNING
Qty: 30 CAPSULE | Refills: 0 | Status: SHIPPED | OUTPATIENT
Start: 2022-06-01 | End: 2022-06-27

## 2022-06-17 NOTE — TELEPHONE ENCOUNTER
Noted refusal. Pharmacy was notified. Marielena Goddard RN ....................  6/17/2022   4:02 PM

## 2022-06-27 ENCOUNTER — OFFICE VISIT (OUTPATIENT)
Dept: INTERNAL MEDICINE | Facility: OTHER | Age: 37
End: 2022-06-27
Attending: INTERNAL MEDICINE
Payer: COMMERCIAL

## 2022-06-27 VITALS
HEART RATE: 98 BPM | SYSTOLIC BLOOD PRESSURE: 126 MMHG | OXYGEN SATURATION: 99 % | DIASTOLIC BLOOD PRESSURE: 104 MMHG | RESPIRATION RATE: 12 BRPM | TEMPERATURE: 97.5 F | HEIGHT: 67 IN | WEIGHT: 195 LBS | BODY MASS INDEX: 30.61 KG/M2

## 2022-06-27 DIAGNOSIS — S89.91XA KNEE INJURY, RIGHT, INITIAL ENCOUNTER: ICD-10-CM

## 2022-06-27 DIAGNOSIS — E66.811 CLASS 1 OBESITY DUE TO EXCESS CALORIES WITHOUT SERIOUS COMORBIDITY WITH BODY MASS INDEX (BMI) OF 31.0 TO 31.9 IN ADULT: Primary | ICD-10-CM

## 2022-06-27 DIAGNOSIS — M25.361 KNEE INSTABILITY, RIGHT: ICD-10-CM

## 2022-06-27 DIAGNOSIS — E66.09 CLASS 1 OBESITY DUE TO EXCESS CALORIES WITHOUT SERIOUS COMORBIDITY WITH BODY MASS INDEX (BMI) OF 31.0 TO 31.9 IN ADULT: Primary | ICD-10-CM

## 2022-06-27 DIAGNOSIS — Z13.1 SCREENING FOR DIABETES MELLITUS: ICD-10-CM

## 2022-06-27 DIAGNOSIS — L70.0 CYSTIC ACNE: ICD-10-CM

## 2022-06-27 DIAGNOSIS — R21 RASH: ICD-10-CM

## 2022-06-27 DIAGNOSIS — R71.8 ELEVATED MCV: ICD-10-CM

## 2022-06-27 DIAGNOSIS — Z82.49 FAMILY HISTORY OF CHF (CONGESTIVE HEART FAILURE): ICD-10-CM

## 2022-06-27 LAB
ALBUMIN SERPL-MCNC: 4.5 G/DL (ref 3.5–5.7)
ALP SERPL-CCNC: 60 U/L (ref 34–104)
ALT SERPL W P-5'-P-CCNC: 12 U/L (ref 7–52)
ANION GAP SERPL CALCULATED.3IONS-SCNC: 7 MMOL/L (ref 3–14)
AST SERPL W P-5'-P-CCNC: 14 U/L (ref 13–39)
BILIRUB SERPL-MCNC: 0.4 MG/DL (ref 0.3–1)
BUN SERPL-MCNC: 10 MG/DL (ref 7–25)
CALCIUM SERPL-MCNC: 9.6 MG/DL (ref 8.6–10.3)
CHLORIDE BLD-SCNC: 101 MMOL/L (ref 98–107)
CO2 SERPL-SCNC: 29 MMOL/L (ref 21–31)
CREAT SERPL-MCNC: 0.71 MG/DL (ref 0.6–1.2)
ERYTHROCYTE [DISTWIDTH] IN BLOOD BY AUTOMATED COUNT: 12.2 % (ref 10–15)
GFR SERPL CREATININE-BSD FRML MDRD: >90 ML/MIN/1.73M2
GLUCOSE BLD-MCNC: 85 MG/DL (ref 70–105)
HCT VFR BLD AUTO: 40.3 % (ref 35–47)
HGB BLD-MCNC: 14 G/DL (ref 11.7–15.7)
MCH RBC QN AUTO: 35.1 PG (ref 26.5–33)
MCHC RBC AUTO-ENTMCNC: 34.7 G/DL (ref 31.5–36.5)
MCV RBC AUTO: 101 FL (ref 78–100)
PLATELET # BLD AUTO: 274 10E3/UL (ref 150–450)
POTASSIUM BLD-SCNC: 4.2 MMOL/L (ref 3.5–5.1)
PROT SERPL-MCNC: 7.1 G/DL (ref 6.4–8.9)
RBC # BLD AUTO: 3.99 10E6/UL (ref 3.8–5.2)
SODIUM SERPL-SCNC: 137 MMOL/L (ref 134–144)
VIT B12 SERPL-MCNC: 293 PG/ML (ref 180–914)
WBC # BLD AUTO: 4.8 10E3/UL (ref 4–11)

## 2022-06-27 PROCEDURE — 82607 VITAMIN B-12: CPT | Mod: ZL | Performed by: INTERNAL MEDICINE

## 2022-06-27 PROCEDURE — 99395 PREV VISIT EST AGE 18-39: CPT | Performed by: INTERNAL MEDICINE

## 2022-06-27 PROCEDURE — 85014 HEMATOCRIT: CPT | Mod: ZL | Performed by: INTERNAL MEDICINE

## 2022-06-27 PROCEDURE — 80053 COMPREHEN METABOLIC PANEL: CPT | Mod: ZL | Performed by: INTERNAL MEDICINE

## 2022-06-27 PROCEDURE — 36415 COLL VENOUS BLD VENIPUNCTURE: CPT | Mod: ZL | Performed by: INTERNAL MEDICINE

## 2022-06-27 RX ORDER — PHENTERMINE HYDROCHLORIDE 37.5 MG/1
37.5 TABLET ORAL
Qty: 30 TABLET | Refills: 3 | Status: SHIPPED | OUTPATIENT
Start: 2022-06-27 | End: 2022-11-30

## 2022-06-27 ASSESSMENT — ENCOUNTER SYMPTOMS
MYALGIAS: 0
EYE PAIN: 0
FREQUENCY: 0
PARESTHESIAS: 0
DIARRHEA: 0
SHORTNESS OF BREATH: 0
DYSURIA: 0
NERVOUS/ANXIOUS: 0
HEMATURIA: 0
JOINT SWELLING: 0
BREAST MASS: 0
WEAKNESS: 0
HEADACHES: 0
COUGH: 0
ARTHRALGIAS: 0
SORE THROAT: 0
HEMATOCHEZIA: 0
PALPITATIONS: 0
ABDOMINAL PAIN: 0
DIZZINESS: 0
NAUSEA: 0
CHILLS: 0
FEVER: 0
HEARTBURN: 0
CONSTIPATION: 0

## 2022-06-27 ASSESSMENT — PAIN SCALES - GENERAL: PAINLEVEL: NO PAIN (0)

## 2022-06-27 NOTE — NURSING NOTE
Patient presents to clinic today for annual physical.   LMP one week ago. She is fasting except for a handful of almonds.  Medication reconciliation completed.    ACP on file? No, form previously provided.     FOOD SECURITY SCREENING QUESTIONS    The next two questions are to help us understand your food security.  If you are feeling you need any assistance in this area, we have resources available to support you today.    Hunger Vital Signs:  Within the past 12 months we worried whether our food would run out before we got money to buy more. Never  Within the past 12 months the food we bought just didn't last and we didn't have money to get more. Never    Gely Pastor CMA(AAMA)..................6/27/2022   3:13 PM

## 2022-06-27 NOTE — PROGRESS NOTES
SUBJECTIVE:   CC: Beth Schmitz is an 37 year old woman who presents for preventive health visit.     She overall is doing well.  She has had some stress recently with the death of her father and the death of her grandmother.  She reports that her father had CHF and passed away at age 62.  She is unaware of the exact etiology of his CHF.    She has a history of cystic acne.  She has been using topical clindamycin/benzyl peroxide.  She finds overall that this has been helpful.  She denies any obvious side effects.    She additionally has had some rash on her lower legs.  She report this that this comes and goes.  She has been using some clobetasol which seems to help.  She seems to have less rash in the summer.  Her mother has known psoriasis and we discussed that this may also be psoriasis.  She is interested in seeing dermatology for further evaluation.    She has been having ongoing issues with her knee.  A year ago she had an injury where it locked up and she essentially fell out of a truck.  Since then she has had a couple episodes where the knee locks.  She has not had any significant swelling.    She is obese.  She has used phentermine for weight loss.  She has found that this has been effective off and on however has not led to sustained weight loss.  She has not had any side effects from the phentermine    She has had ongoing fatigue this past spring after having COVID.  She is due for diabetes screening.    Patient has been advised of split billing requirements and indicates understanding: Yes     Healthy Habits:     Getting at least 3 servings of Calcium per day:  Yes    Bi-annual eye exam:  Yes    Dental care twice a year:  Yes    Sleep apnea or symptoms of sleep apnea:  None    Diet:  Regular (no restrictions)    Frequency of exercise:  2-3 days/week    Duration of exercise:  15-30 minutes    Taking medications regularly:  Yes    Medication side effects:  None    PHQ-2 Total Score: 0    Additional  concerns today:  No    Today's PHQ-2 Score:   PHQ-2 ( 1999 Pfizer) 6/27/2022   Q1: Little interest or pleasure in doing things 0   Q2: Feeling down, depressed or hopeless 0   PHQ-2 Score 0   PHQ-2 Total Score (12-17 Years)- Positive if 3 or more points; Administer PHQ-A if positive -   Q1: Little interest or pleasure in doing things Not at all   Q2: Feeling down, depressed or hopeless Not at all   PHQ-2 Score 0       Abuse: Current or Past (Physical, Sexual or Emotional) - No  Do you feel safe in your environment? Yes    Have you ever done Advance Care Planning? (For example, a Health Directive, POLST, or a discussion with a medical provider or your loved ones about your wishes): No, advance care planning information given to patient to review.  Advanced care planning was discussed at today's visit.    Social History     Tobacco Use     Smoking status: Never Smoker     Smokeless tobacco: Never Used   Substance Use Topics     Alcohol use: Yes     Comment: couple times a month          Alcohol Use 6/27/2022   Prescreen: >3 drinks/day or >7 drinks/week? No       Reviewed orders with patient.  Reviewed health maintenance and updated orders accordingly - Yes    Breast Cancer Screening:  Any new diagnosis of family breast, ovarian, or bowel cancer? No    FHS-7: No flowsheet data found.  Patient under 40 years of age: Routine Mammogram Screening not recommended.   Pertinent mammograms are reviewed under the imaging tab.    History of abnormal Pap smear: NO - age 30-65 PAP every 5 years with negative HPV co-testing recommended     Reviewed and updated as needed this visit by clinical staff   Tobacco  Allergies  Meds   Med Hx  Surg Hx  Fam Hx  Soc Hx          Reviewed and updated as needed this visit by Provider   Tobacco  Allergies  Meds  Problems  Med Hx  Surg Hx  Fam Hx           Current Outpatient Medications   Medication     clindamycin phos-benzoyl perox (ACANYA) 1.2-2.5 % external gel     clobetasol  "(TEMOVATE) 0.05 % external ointment     phentermine (ADIPEX-P) 37.5 MG capsule     phentermine (ADIPEX-P) 37.5 MG tablet     semaglutide (OZEMPIC) 2 MG/1.5ML SOPN pen     No current facility-administered medications for this visit.       Review of Systems  CONSTITUTIONAL: Negative for fever, chills, night sweats  INTEGUMENTARY/SKIN/LYMPH: Negative for moles or lesions; swollen lymph nodes  EYES: Negative for vision changes or irritation  ENT: Negative for additional ear, mouth and throat problems  RESP: Negative for significant cough or SOB  CV: Negative for chest pain, palpitations or increased peripheral edema  GI: Negative for abdominal pain, or change in bowel habits or blood in the stools/black stools  : Negative for unusual urinary or vaginal symptoms. No vaginal bleeding.  MUSCULOSKELETAL: Knee pain as above  NEURO: Negative for new headaches, weakness or paraesthesias  PSYCHIATRIC: Negative for changes in mood or affect; significant anxiety or depression         OBJECTIVE:   BP (!) 126/104 (BP Location: Right arm, Patient Position: Sitting, Cuff Size: Adult Regular)   Pulse 98   Temp 97.5  F (36.4  C) (Tympanic)   Resp 12   Ht 1.689 m (5' 6.5\")   Wt 88.5 kg (195 lb)   LMP 06/20/2022   SpO2 99%   BMI 31.00 kg/m    Physical Exam  GEN: Vitals reviewed. Healthy appearing. Patient is in no acute distress. Cooperative with exam.  HEENT: Normocephalic atraumatic.  Eyes grossly normal to inspection.  No discharge or erythema, or obvious scleral/conjunctival abnormalities. Oropharynx with no erythema or exudates. Dentition adequate.  EACs clear bilaterally, TM gray with normal landmarks.  NECK: Supple; no thyromegaly or masses noted.  No cervical or supraclavicular lymphadenopathy.  CV: Heart regular in rate and rhythm with no murmur.    LUNGS: No audible wheeze, cough, or visible cyanosis.  No visible retractions or increased work of breathing.  Lungs clear to auscultation bilaterally.    ABD:  Obese, " Soft, nontender, and nondistended.  No rebound. Bowel sounds positive.  SKIN: Warm and dry to touch.  Visible skin clear. No significant rash, abnormal pigmentation or lesions.  EXT: No clubbing or cyanosis.  No peripheral edema.  NEURO: Alert and oriented to person, place, and time.  Cranial nerves II-XII grossly intact with no focal or lateralizing deficits.  Muscle tone normal.  Gait normal. No tremor.   MSK: ROM of upper and lower ext symmetric and full.  PSYCH: Mood is good.  Mentation appears normal, affect normal/bright, judgement and insight intact, normal speech and appearance well-groomed.      Diagnostic Test Results:  Labs reviewed in Epic    ASSESSMENT/PLAN:   1. Cystic acne  -Continue on topical agent.  Call for refills    2. Rash  Referral placed to dermatology.  Discussed that this may reflect psoriasis.  She is to call with any issues scheduling this  - Adult Dermatology Referral; Future  - CBC W PLT No Diff    3. Class 1 obesity due to excess calories without serious comorbidity with body mass index (BMI) of 31.0 to 31.9 in adult  We discussed options for weight loss.  At this time we will see if her insurance will cover semaglutide which would likely be a safer medication especially with her family history.  Additionally she likely would have more steady and persistent weight loss with this.  She is provided a prescription for phentermine in case semaglutide is not covered.  - semaglutide (OZEMPIC) 2 MG/1.5ML SOPN pen; Inject 0.5 mg Subcutaneous every 7 days Inject 0.25mg weekly for 2 weeks and then increase to 0.5mg dose  Dispense: 1.5 mg; Refill: 1  - phentermine (ADIPEX-P) 37.5 MG tablet; Take 1 tablet (37.5 mg) by mouth every morning (before breakfast)  Dispense: 30 tablet; Refill: 3    4. Screening for diabetes mellitus  - Comprehensive Metabolic Panel    5. Family history of CHF (congestive heart failure)  With her family history of early onset CHF in 2 generations I do question if they had  "underlining valvular disease.  Patient will be referred for echocardiogram.  She is to look further into why her father developed CHF and we may need to consider additional testing for her.  - Echocardiogram Complete; Future    6. Knee instability, right  With ongoing knee instability and locking over the past year would recommend an MRI.  Order placed.  - MR Knee Right w/o Contrast; Future    7. Knee injury, right, initial encounter  See above  - MR Knee Right w/o Contrast; Future    8. Elevated MCV  Lab work today returned with an elevated MCV.  B12 level added on and will be replaced if indicated  - Vitamin B12      Patient has been advised of split billing requirements and indicates understanding: Yes    COUNSELING:  Reviewed preventive health counseling, as reflected in patient instructions    Estimated body mass index is 31 kg/m  as calculated from the following:    Height as of this encounter: 1.689 m (5' 6.5\").    Weight as of this encounter: 88.5 kg (195 lb).    Weight management plan: Discussed healthy diet and exercise guidelines    She reports that she has never smoked. She has never used smokeless tobacco.      Counseling Resources:  ATP IV Guidelines  Pooled Cohorts Equation Calculator  Breast Cancer Risk Calculator  BRCA-Related Cancer Risk Assessment: FHS-7 Tool  FRAX Risk Assessment  ICSI Preventive Guidelines  Dietary Guidelines for Americans, 2010  USDA's MyPlate  ASA Prophylaxis  Lung CA Screening    Amy Harrington, DO  [facility]  "

## 2022-11-29 DIAGNOSIS — E66.09 CLASS 1 OBESITY DUE TO EXCESS CALORIES WITHOUT SERIOUS COMORBIDITY WITH BODY MASS INDEX (BMI) OF 31.0 TO 31.9 IN ADULT: ICD-10-CM

## 2022-11-29 DIAGNOSIS — E66.811 CLASS 1 OBESITY DUE TO EXCESS CALORIES WITHOUT SERIOUS COMORBIDITY WITH BODY MASS INDEX (BMI) OF 31.0 TO 31.9 IN ADULT: ICD-10-CM

## 2022-11-30 RX ORDER — PHENTERMINE HYDROCHLORIDE 37.5 MG/1
TABLET ORAL
Qty: 30 TABLET | Refills: 3 | Status: SHIPPED | OUTPATIENT
Start: 2022-11-30 | End: 2023-05-12

## 2022-11-30 NOTE — TELEPHONE ENCOUNTER
[id number] sent Rx request for the following:      Requested Prescriptions   Pending Prescriptions Disp Refills     phentermine (ADIPEX-P) 37.5 MG tablet [Pharmacy Med Name: PHENTERMINE 37.5MG TABLETS] 30 tablet      Sig: TAKE 1 TABLET BY MOUTH EVERY MORNING BEFORE BREAKFAST   Last Prescription Date:   6/27/22  Last Fill Qty/Refills:         30, R-3 (local print)    Last Office Visit:              6/27/22   Future Office visit:           None    Per LOV note:  Class 1 obesity due to excess calories without serious comorbidity with body mass index (BMI) of 31.0 to 31.9 in adult  We discussed options for weight loss.  At this time we will see if her insurance will cover semaglutide which would likely be a safer medication especially with her family history.  Additionally she likely would have more steady and persistent weight loss with this.  She is provided a prescription for phentermine in case semaglutide is not covered.    In clinical absence of patient's primary, Amy Harrington, patient is requesting that this message be sent to the covering provider for consideration please.    Josi Myles RN .............. 11/30/2022  2:28 PM

## 2022-12-28 ENCOUNTER — MYC MEDICAL ADVICE (OUTPATIENT)
Dept: INTERNAL MEDICINE | Facility: OTHER | Age: 37
End: 2022-12-28

## 2023-01-24 DIAGNOSIS — E66.811 CLASS 1 OBESITY DUE TO EXCESS CALORIES WITHOUT SERIOUS COMORBIDITY WITH BODY MASS INDEX (BMI) OF 31.0 TO 31.9 IN ADULT: ICD-10-CM

## 2023-01-24 DIAGNOSIS — E66.09 CLASS 1 OBESITY DUE TO EXCESS CALORIES WITHOUT SERIOUS COMORBIDITY WITH BODY MASS INDEX (BMI) OF 31.0 TO 31.9 IN ADULT: ICD-10-CM

## 2023-01-30 NOTE — TELEPHONE ENCOUNTER
"  Last Prescription Date: 8/18/22  Last Qty/Refills: 1.5 ml / 4  Last Office Visit: 6/27/22 Len  Future Office Visit: none     Requested Prescriptions   Pending Prescriptions Disp Refills     OZEMPIC (0.25 OR 0.5 MG/DOSE) 2 MG/1.5ML SOPN pen [Pharmacy Med Name: OZEMPIC 0.25 OR 0.5MG/DOS 1X2MG PEN] 1.5 mL 4     Sig: INJECT 0.25 MG UNDER THE SKIN WEEKLY FOR 2 WEEKS, THEN INCREASE TO 0.5 MG WEEKLY       GLP-1 Agonists Protocol Failed - 1/24/2023  9:35 AM        Failed - HgbA1C in past 3 or 6 months     If HgbA1C is 8 or greater, it needs to be on file within the past 3 months.  If less than 8, must be on file within the past 6 months.     Recent Labs   Lab Test 04/28/21  1116   A1C 4.9             Failed - Recent (6 mo) or future (30 days) visit within the authorizing provider's specialty     Patient had office visit in the last 6 months or has a visit in the next 30 days with authorizing provider.  See \"Patient Info\" tab in inbasket, or \"Choose Columns\" in Meds & Orders section of the refill encounter.            Passed - Medication is active on med list        Passed - Patient is age 18 or older        Passed - No active pregnancy on record        Passed - Normal serum creatinine on file in past 12 months     Recent Labs   Lab Test 06/27/22  1613   CR 0.71       Ok to refill medication if creatinine is low          Passed - No positive pregnancy test in past 12 months             "

## 2023-01-31 RX ORDER — SEMAGLUTIDE 1.34 MG/ML
INJECTION, SOLUTION SUBCUTANEOUS
Qty: 1.5 ML | Refills: 4 | Status: SHIPPED | OUTPATIENT
Start: 2023-01-31 | End: 2024-01-17 | Stop reason: ALTCHOICE

## 2023-05-10 DIAGNOSIS — E66.811 CLASS 1 OBESITY DUE TO EXCESS CALORIES WITHOUT SERIOUS COMORBIDITY WITH BODY MASS INDEX (BMI) OF 31.0 TO 31.9 IN ADULT: ICD-10-CM

## 2023-05-10 DIAGNOSIS — E66.09 CLASS 1 OBESITY DUE TO EXCESS CALORIES WITHOUT SERIOUS COMORBIDITY WITH BODY MASS INDEX (BMI) OF 31.0 TO 31.9 IN ADULT: ICD-10-CM

## 2023-05-12 RX ORDER — PHENTERMINE HYDROCHLORIDE 37.5 MG/1
TABLET ORAL
Qty: 30 TABLET | Refills: 0 | Status: SHIPPED | OUTPATIENT
Start: 2023-05-12 | End: 2023-06-29

## 2023-06-27 DIAGNOSIS — E66.811 CLASS 1 OBESITY DUE TO EXCESS CALORIES WITHOUT SERIOUS COMORBIDITY WITH BODY MASS INDEX (BMI) OF 31.0 TO 31.9 IN ADULT: ICD-10-CM

## 2023-06-27 DIAGNOSIS — L70.0 CYSTIC ACNE: ICD-10-CM

## 2023-06-27 DIAGNOSIS — E66.09 CLASS 1 OBESITY DUE TO EXCESS CALORIES WITHOUT SERIOUS COMORBIDITY WITH BODY MASS INDEX (BMI) OF 31.0 TO 31.9 IN ADULT: ICD-10-CM

## 2023-06-29 RX ORDER — CLINDAMYCIN PHOSPHATE, BENZOYL PEROXIDE 25; 10 MG/G; MG/G
GEL TOPICAL
Qty: 50 G | Refills: 3 | Status: SHIPPED | OUTPATIENT
Start: 2023-06-29 | End: 2024-01-16

## 2023-06-29 RX ORDER — PHENTERMINE HYDROCHLORIDE 37.5 MG/1
TABLET ORAL
Qty: 30 TABLET | Refills: 3 | Status: SHIPPED | OUTPATIENT
Start: 2023-06-29 | End: 2024-01-17 | Stop reason: ALTCHOICE

## 2023-06-29 NOTE — TELEPHONE ENCOUNTER
[facility] [place] sent Rx request for the following:   CLINDAMYCIN/BPO 1.2-2.5% GEL 50GM  Last Prescription Date:   11/22/21  Last Fill Qty/Refills:         50 g, R-3    Last Office Visit:              6/27/22   Future Office visit:           7/20/23  Roselia Norton RN on 6/29/2023 at 3:34 PM

## 2023-06-29 NOTE — TELEPHONE ENCOUNTER
[facility] [place] sent Rx request for the following:   PHENTERMINE 37.5MG TABLETS  Last Prescription Date:   5/12/23  Last Fill Qty/Refills:         30, R-0    Last Office Visit:              6/27/22   Future Office visit:           7/20/23    Roselia Norton RN on 6/29/2023 at 3:45 PM

## 2023-10-25 ENCOUNTER — TELEPHONE (OUTPATIENT)
Dept: INTERNAL MEDICINE | Facility: OTHER | Age: 38
End: 2023-10-25

## 2023-10-25 NOTE — TELEPHONE ENCOUNTER
Hello. We received a PA request for Ozempic for this patient. According to her chart, this has not been prescribed since 01/13/2023. Insurance will require updated clinicals. Would you like me to continue with this? Patient lives in [place].  Andie GARYFarheen Booker on 10/25/2023 at 2:14 PM

## 2023-10-25 NOTE — TELEPHONE ENCOUNTER
Recommend holding on PA until patient has an in person visit for insurance requirements. She can see me here, Nancy in [place] or a provider closer to her. IM nursing please notify and assist in scheduling if desired here.

## 2023-12-04 ENCOUNTER — MYC MEDICAL ADVICE (OUTPATIENT)
Dept: INTERNAL MEDICINE | Facility: OTHER | Age: 38
End: 2023-12-04
Payer: COMMERCIAL

## 2023-12-04 DIAGNOSIS — E66.09 CLASS 1 OBESITY DUE TO EXCESS CALORIES WITHOUT SERIOUS COMORBIDITY WITH BODY MASS INDEX (BMI) OF 31.0 TO 31.9 IN ADULT: ICD-10-CM

## 2023-12-04 DIAGNOSIS — E66.811 CLASS 1 OBESITY DUE TO EXCESS CALORIES WITHOUT SERIOUS COMORBIDITY WITH BODY MASS INDEX (BMI) OF 31.0 TO 31.9 IN ADULT: ICD-10-CM

## 2023-12-06 RX ORDER — PHENTERMINE HYDROCHLORIDE 37.5 MG/1
1 TABLET ORAL
Qty: 30 TABLET | Refills: 1 | OUTPATIENT
Start: 2023-12-06

## 2024-01-17 ENCOUNTER — OFFICE VISIT (OUTPATIENT)
Dept: INTERNAL MEDICINE | Facility: OTHER | Age: 39
End: 2024-01-17
Attending: INTERNAL MEDICINE
Payer: COMMERCIAL

## 2024-01-17 ENCOUNTER — MYC MEDICAL ADVICE (OUTPATIENT)
Dept: INTERNAL MEDICINE | Facility: OTHER | Age: 39
End: 2024-01-17

## 2024-01-17 VITALS
DIASTOLIC BLOOD PRESSURE: 84 MMHG | TEMPERATURE: 98 F | RESPIRATION RATE: 14 BRPM | HEIGHT: 67 IN | HEART RATE: 112 BPM | BODY MASS INDEX: 33.56 KG/M2 | WEIGHT: 213.8 LBS | OXYGEN SATURATION: 98 % | SYSTOLIC BLOOD PRESSURE: 128 MMHG

## 2024-01-17 DIAGNOSIS — L30.9 ECZEMA, UNSPECIFIED TYPE: ICD-10-CM

## 2024-01-17 DIAGNOSIS — E66.09 CLASS 1 OBESITY DUE TO EXCESS CALORIES WITHOUT SERIOUS COMORBIDITY WITH BODY MASS INDEX (BMI) OF 31.0 TO 31.9 IN ADULT: Primary | ICD-10-CM

## 2024-01-17 DIAGNOSIS — Z01.818 PREOPERATIVE EXAMINATION: Primary | ICD-10-CM

## 2024-01-17 DIAGNOSIS — N91.2 AMENORRHEA: ICD-10-CM

## 2024-01-17 DIAGNOSIS — E66.811 CLASS 1 OBESITY DUE TO EXCESS CALORIES WITHOUT SERIOUS COMORBIDITY WITH BODY MASS INDEX (BMI) OF 31.0 TO 31.9 IN ADULT: ICD-10-CM

## 2024-01-17 DIAGNOSIS — L70.0 CYSTIC ACNE: ICD-10-CM

## 2024-01-17 DIAGNOSIS — E66.9 OBESITY WITHOUT SERIOUS COMORBIDITY, UNSPECIFIED CLASSIFICATION, UNSPECIFIED OBESITY TYPE: ICD-10-CM

## 2024-01-17 DIAGNOSIS — E66.811 CLASS 1 OBESITY DUE TO EXCESS CALORIES WITHOUT SERIOUS COMORBIDITY WITH BODY MASS INDEX (BMI) OF 31.0 TO 31.9 IN ADULT: Primary | ICD-10-CM

## 2024-01-17 DIAGNOSIS — Z12.4 CERVICAL CANCER SCREENING: ICD-10-CM

## 2024-01-17 DIAGNOSIS — E66.09 CLASS 1 OBESITY DUE TO EXCESS CALORIES WITHOUT SERIOUS COMORBIDITY WITH BODY MASS INDEX (BMI) OF 31.0 TO 31.9 IN ADULT: ICD-10-CM

## 2024-01-17 DIAGNOSIS — Z13.1 SCREENING FOR DIABETES MELLITUS: ICD-10-CM

## 2024-01-17 DIAGNOSIS — Z13.220 SCREENING FOR HYPERLIPIDEMIA: ICD-10-CM

## 2024-01-17 LAB
ALBUMIN SERPL BCG-MCNC: 4.7 G/DL (ref 3.5–5.2)
ALP SERPL-CCNC: 96 U/L (ref 40–150)
ALT SERPL W P-5'-P-CCNC: 35 U/L (ref 0–50)
ANION GAP SERPL CALCULATED.3IONS-SCNC: 11 MMOL/L (ref 7–15)
AST SERPL W P-5'-P-CCNC: 30 U/L (ref 0–45)
BILIRUB SERPL-MCNC: 0.7 MG/DL
BUN SERPL-MCNC: 17.3 MG/DL (ref 6–20)
CALCIUM SERPL-MCNC: 10 MG/DL (ref 8.6–10)
CHLORIDE SERPL-SCNC: 97 MMOL/L (ref 98–107)
CHOLEST SERPL-MCNC: 202 MG/DL
CREAT SERPL-MCNC: 0.78 MG/DL (ref 0.51–0.95)
DEPRECATED HCO3 PLAS-SCNC: 26 MMOL/L (ref 22–29)
EGFRCR SERPLBLD CKD-EPI 2021: >90 ML/MIN/1.73M2
ERYTHROCYTE [DISTWIDTH] IN BLOOD BY AUTOMATED COUNT: 12.4 % (ref 10–15)
FASTING STATUS PATIENT QL REPORTED: YES
FSH SERPL IRP2-ACNC: 106.9 MIU/ML
GLUCOSE SERPL-MCNC: 89 MG/DL (ref 70–99)
HCT VFR BLD AUTO: 38.9 % (ref 35–47)
HDLC SERPL-MCNC: 77 MG/DL
HGB BLD-MCNC: 13.9 G/DL (ref 11.7–15.7)
LDLC SERPL CALC-MCNC: 113 MG/DL
MCH RBC QN AUTO: 35.2 PG (ref 26.5–33)
MCHC RBC AUTO-ENTMCNC: 35.7 G/DL (ref 31.5–36.5)
MCV RBC AUTO: 99 FL (ref 78–100)
NONHDLC SERPL-MCNC: 125 MG/DL
PLATELET # BLD AUTO: 266 10E3/UL (ref 150–450)
POTASSIUM SERPL-SCNC: 4.2 MMOL/L (ref 3.4–5.3)
PROLACTIN SERPL 3RD IS-MCNC: 11 NG/ML (ref 5–23)
PROT SERPL-MCNC: 7.8 G/DL (ref 6.4–8.3)
RBC # BLD AUTO: 3.95 10E6/UL (ref 3.8–5.2)
SODIUM SERPL-SCNC: 134 MMOL/L (ref 135–145)
TRIGL SERPL-MCNC: 59 MG/DL
TSH SERPL DL<=0.005 MIU/L-ACNC: 1.62 UIU/ML (ref 0.3–4.2)
WBC # BLD AUTO: 6.2 10E3/UL (ref 4–11)

## 2024-01-17 PROCEDURE — 80053 COMPREHEN METABOLIC PANEL: CPT | Mod: ZL | Performed by: INTERNAL MEDICINE

## 2024-01-17 PROCEDURE — 80061 LIPID PANEL: CPT | Mod: ZL | Performed by: INTERNAL MEDICINE

## 2024-01-17 PROCEDURE — 36415 COLL VENOUS BLD VENIPUNCTURE: CPT | Mod: ZL | Performed by: INTERNAL MEDICINE

## 2024-01-17 PROCEDURE — 84443 ASSAY THYROID STIM HORMONE: CPT | Mod: ZL | Performed by: INTERNAL MEDICINE

## 2024-01-17 PROCEDURE — 99395 PREV VISIT EST AGE 18-39: CPT | Performed by: INTERNAL MEDICINE

## 2024-01-17 PROCEDURE — 84146 ASSAY OF PROLACTIN: CPT | Mod: ZL | Performed by: INTERNAL MEDICINE

## 2024-01-17 PROCEDURE — 93000 ELECTROCARDIOGRAM COMPLETE: CPT | Performed by: STUDENT IN AN ORGANIZED HEALTH CARE EDUCATION/TRAINING PROGRAM

## 2024-01-17 PROCEDURE — 83001 ASSAY OF GONADOTROPIN (FSH): CPT | Mod: ZL | Performed by: INTERNAL MEDICINE

## 2024-01-17 PROCEDURE — 85027 COMPLETE CBC AUTOMATED: CPT | Mod: ZL | Performed by: INTERNAL MEDICINE

## 2024-01-17 RX ORDER — SEMAGLUTIDE 1.34 MG/ML
INJECTION, SOLUTION SUBCUTANEOUS
Qty: 1.5 ML | Status: CANCELLED | OUTPATIENT
Start: 2024-01-17

## 2024-01-17 RX ORDER — CLINDAMYCIN PHOSPHATE, BENZOYL PEROXIDE 25; 10 MG/G; MG/G
GEL TOPICAL 2 TIMES DAILY PRN
Qty: 50 G | Refills: 5 | Status: SHIPPED | OUTPATIENT
Start: 2024-01-17

## 2024-01-17 RX ORDER — CLOBETASOL PROPIONATE 0.5 MG/G
OINTMENT TOPICAL 2 TIMES DAILY
Qty: 60 G | Refills: 4 | Status: CANCELLED | OUTPATIENT
Start: 2024-01-17

## 2024-01-17 RX ORDER — PHENTERMINE HYDROCHLORIDE 37.5 MG/1
1 TABLET ORAL
Qty: 30 TABLET | Status: CANCELLED | OUTPATIENT
Start: 2024-01-17

## 2024-01-17 ASSESSMENT — ENCOUNTER SYMPTOMS
BREAST MASS: 0
SORE THROAT: 0
SHORTNESS OF BREATH: 0
NAUSEA: 0
CONSTIPATION: 0
COUGH: 0
DYSURIA: 0
PALPITATIONS: 0
WEAKNESS: 0
FREQUENCY: 0
HEMATURIA: 0
CHILLS: 0
PARESTHESIAS: 0
ARTHRALGIAS: 0
NERVOUS/ANXIOUS: 0
JOINT SWELLING: 0
DIZZINESS: 0
HEARTBURN: 0
MYALGIAS: 0
ABDOMINAL PAIN: 0
FEVER: 0
EYE PAIN: 0
HEADACHES: 0
DIARRHEA: 0
HEMATOCHEZIA: 0

## 2024-01-17 ASSESSMENT — PAIN SCALES - GENERAL: PAINLEVEL: NO PAIN (0)

## 2024-01-17 NOTE — NURSING NOTE
"Chief Complaint   Patient presents with    Wellness Visit    Refill Request       Initial BP (!) 140/96 (BP Location: Right arm, Patient Position: Sitting, Cuff Size: Adult Large)   Pulse 112   Temp 98  F (36.7  C) (Temporal)   Resp 14   Ht 1.689 m (5' 6.5\")   Wt 97 kg (213 lb 12.8 oz)   LMP 09/28/2023 (Approximate)   SpO2 98%   BMI 33.99 kg/m   Estimated body mass index is 33.99 kg/m  as calculated from the following:    Height as of this encounter: 1.689 m (5' 6.5\").    Weight as of this encounter: 97 kg (213 lb 12.8 oz).  Medication Review: complete    The next two questions are to help us understand your food security.  If you are feeling you need any assistance in this area, we have resources available to support you today.          1/17/2024   SDOH- Food Insecurity   Within the past 12 months, did you worry that your food would run out before you got money to buy more? N   Within the past 12 months, did the food you bought just not last and you didn t have money to get more? N         Health Care Directive:  Patient does not have a Health Care Directive or Living Will: Discussed advance care planning with patient; however, patient declined at this time.    Dalia Bragg LPN      "

## 2024-01-17 NOTE — PROGRESS NOTES
Preventive Care Visit  [facility] AND [facility]  Amy Harrington DO, Internal Medicine  Jan 17, 2024       SUBJECTIVE:   Beth is a 38 year old, presenting for the following:  Wellness Visit, Refill Request, and Abnormal Bleeding Problem (Last period on 9/28/2023 )  Patient is also here for preop exam for upcoming ankle surgery with tendon repair.  This is scheduled on 1/30/2024.    [phone]  Attn: Dr. Krause    Healthy Habits:     Getting at least 3 servings of Calcium per day:  NO    Bi-annual eye exam:  NO    Dental care twice a year:  Yes    Sleep apnea or symptoms of sleep apnea:  Daytime drowsiness    Diet:  Carbohydrate counting and Breakfast skipped    Frequency of exercise:  2-3 days/week    Duration of exercise:  30-45 minutes    Taking medications regularly:  Yes    Medication side effects:  None    Additional concerns today:  No    Preop questionnaire:    1. No - Have you ever had a heart attack or stroke?  2. No - Have you ever had surgery on your heart or blood vessels, such as a stent, coronary (heart) bypass, or surgery on an artery in the head, neck, heart, or legs?  3. No - Do you have chest pain when you are physically active?  4. No - Do you have a history of heart failure?  5. No - Do you currently have a cold, bronchitis, or symptoms of other respiratory (head and chest) infections?  6. No - Do you have a cough, shortness of breath, or wheezing?  7. No - Do you or anyone in your family have a history of blood clots?  8. No - Do you or anyone in your family have a serious bleeding problem, such as long-lasting bleeding after surgeries or cuts?  9. No - Have you ever had anemia or been told to take iron pills?  10. No - Have you had any abnormal blood loss such as black, tarry or bloody stools, or abnormal vaginal bleeding?  11. No - Have you ever had a blood transfusion?  12. Yes - Are you willing to have a blood transfusion if it is medically needed before, during, or after your  surgery?  13. No - Have you or anyone in your family ever had problems with anesthesia (sedation for surgery)?  14. No - Do you have sleep apnea, excessive snoring, or daytime drowsiness?   15. No - Do you have any artifical heart valves or other implanted medical devices, such as a pacemaker, defibrillator, or continuous glucose monitor?  16. No - Do you have any artifical joints?  17. No - Are you allergic to latex?  18. No - Is there any chance that you may be pregnant?  She has had a previous tubal ligation    Patient overall has been doing fairly well.  Her blood pressure was initially elevated today.  She has not been drinking any caffeine.    She has a history of cystic acne and eczema and continues on topical agents for this.  She does need refills at this time.  She has found benefit with the topical agents for her acne.    She continues to want to work on weight loss.  She has tried Ozempic and overall tolerated it.  She did have 2 days of a stomachache after taking the 0.5 mg dose.  She also has tried phentermine which she overall tolerated with no side effects.    She is due for diabetes and cholesterol screening.  She is due for cervical cancer screening.  She has not had any periods since September and prior to that they were regular.  She has also been developing some hot flashes.  She is questioning whether she is having menopausal symptoms.  The    Social History     Tobacco Use     Smoking status: Former     Packs/day: 0.25     Years: 2.00     Additional pack years: 0.00     Total pack years: 0.50     Types: Cigarettes     Start date:      Quit date: 2/15/2006     Years since quittin.9     Smokeless tobacco: Never   Substance Use Topics     Alcohol use: Yes     Comment: couple times a month            2024     4:08 PM   Alcohol Use   Prescreen: >3 drinks/day or >7 drinks/week? No     Reviewed orders with patient.  Reviewed health maintenance and updated orders accordingly -  Yes      Breast Cancer Screening:  Any new diagnosis of family breast, ovarian, or bowel cancer? No    FHS-7:        No data to display                Patient under 40 years of age: Routine Mammogram Screening not recommended.   Pertinent mammograms are reviewed under the imaging tab.    History of abnormal Pap smear: NO - age 30-65 PAP every 5 years with negative HPV co-testing recommended      3/9/2018    12:00 PM   PAP / HPV   PAP-ABSTRACT See Scanned Document           This result is from an external source.     Reviewed and updated as needed this visit by clinical staff   Tobacco  Allergies  Meds  Problems  Med Hx  Surg Hx  Fam Hx          Reviewed and updated as needed this visit by Provider   Tobacco  Allergies  Meds  Problems  Med Hx  Surg Hx  Fam Hx          History is discussed and updated on 1/23/2024 with patient.  It is current to the best of my knowledge as below.    Past Medical History:   Diagnosis Date     Cystic acne      Obesity      Sprain of anterior talofibular ligament of left ankle      Thyroiditis     per [facility] at [facility]        Past Surgical History:   Procedure Laterality Date     BLOOD PATCH      x2;  after spinal injection     CHOLECYSTECTOMY       TUBAL LIGATION           Current Outpatient Medications   Medication Sig Dispense Refill     clindamycin phos-benzoyl perox (ACANYA) 1.2-2.5 % external gel Apply topically 2 times daily as needed (Acne) 50 g 5     clobetasol (TEMOVATE) 0.05 % external ointment Apply topically 2 times daily 60 g 4     semaglutide (OZEMPIC) 2 MG/3ML pen Inject 0.5 mg Subcutaneous every 7 days 9 mL 4     phentermine (ADIPEX-P) 37.5 MG tablet Take 1 tablet (37.5 mg) by mouth every morning (before breakfast) 30 tablet 0       Allergies   Allergen Reactions     Influenza Virus Vaccine [Influenza Virus Vaccine] Other (See Comments)     paralysis        Family History   Problem Relation Age of Onset     Rheumatoid Arthritis Mother      Psoriasis Mother       Heart Failure Father      Diabetes Type 2  Father      No Known Problems Sister      Heart Failure Paternal Grandfather      No Known Problems Daughter      No Known Problems Daughter      No Known Problems Son      Breast Cancer Maternal Grandmother        Family Status   Relation Name Status     Mo  Alive     Fa   at age 62     Sis  Alive     PGFa  (Not Specified)     Ezequiel  Alive     Ezequiel  Alive     Son  Alive     MGMo  Alive        Social History     Tobacco Use     Smoking status: Former     Packs/day: 0.25     Years: 2.00     Additional pack years: 0.00     Total pack years: 0.50     Types: Cigarettes     Start date:      Quit date: 2/15/2006     Years since quittin.9     Smokeless tobacco: Never   Substance Use Topics     Alcohol use: Yes     Comment: couple times a month        Social History     Social History Narrative    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 Has three children Cesilia (); Yumiko () Giovani ().  , Alex,  is .          Review of Systems   Constitutional:  Negative for chills and fever.   HENT:  Negative for congestion, ear pain, hearing loss and sore throat.    Eyes:  Negative for pain and visual disturbance.   Respiratory:  Negative for cough and shortness of breath.    Cardiovascular:  Negative for chest pain and palpitations.   Gastrointestinal:  Negative for abdominal pain, constipation, diarrhea and nausea.   Genitourinary:  Negative for dysuria, frequency, genital sores, hematuria, pelvic pain, urgency, vaginal bleeding and vaginal discharge.   Musculoskeletal:  Negative for arthralgias, joint swelling and myalgias.   Skin:  Negative for rash.   Neurological:  Negative for dizziness, weakness and headaches.   Psychiatric/Behavioral:  The patient is not nervous/anxious.      OBJECTIVE:   /84 (BP Location: Right arm, Patient Position: Sitting, Cuff Size: Adult Regular)   Pulse 112   Temp 98  F (36.7  C) (Temporal)   Resp 14   Ht  "1.689 m (5' 6.5\")   Wt 97 kg (213 lb 12.8 oz)   LMP 09/28/2023 (Approximate)   SpO2 98%   BMI 33.99 kg/m     Estimated body mass index is 33.99 kg/m  as calculated from the following:    Height as of this encounter: 1.689 m (5' 6.5\").    Weight as of this encounter: 97 kg (213 lb 12.8 oz).    Physical Exam  GEN: Vitals reviewed. Healthy appearing. Patient is in no acute distress. Cooperative with exam.  HEENT: Normocephalic atraumatic.  Eyes grossly normal to inspection.  No discharge or erythema, or obvious scleral/conjunctival abnormalities. Oropharynx with no erythema or exudates. Dentition adequate.  EACs clear bilaterally, TM gray with normal landmarks.  NECK: Supple; no thyromegaly or masses noted.  No cervical or supraclavicular lymphadenopathy.  CV: Heart regular in rate and rhythm with no murmur.    LUNGS: No audible wheeze, cough, or visible cyanosis.  No visible retractions or increased work of breathing.  Lungs clear to auscultation bilaterally.    ABD:  Nondistended  SKIN: Warm and dry to touch.  Visible skin clear. No significant rash, abnormal pigmentation or lesions.  EXT: No clubbing or cyanosis.  No peripheral edema.  NEURO: Alert and oriented to person, place, and time.  Cranial nerves II-XII grossly intact with no focal or lateralizing deficits.  Muscle tone normal.  Gait normal. No tremor.   MSK: ROM of upper and lower ext symmetric and full.  PSYCH: Mood is fair.  Mentation appears normal, affect normal/bright, judgement and insight intact, normal speech and appearance well-groomed.      Diagnostic Test Results:  Labs reviewed in Epic  Recent Results (from the past 240 hour(s))   EKG 12-lead, tracing only    Collection Time: 01/17/24  5:25 PM   Result Value Ref Range    Systolic Blood Pressure  mmHg    Diastolic Blood Pressure  mmHg    Ventricular Rate 94 BPM    Atrial Rate 94 BPM    KY Interval 132 ms    QRS Duration 80 ms     ms    QTc 427 ms    P Axis 20 degrees    R AXIS 22 " degrees    T Axis 29 degrees    Interpretation ECG       Sinus rhythm  Normal ECG  No previous ECGs available  Confirmed by oRd Bell ([id number]) on 1/18/2024 5:26:53 PM     Comprehensive metabolic panel    Collection Time: 01/17/24  5:32 PM   Result Value Ref Range    Sodium 134 (L) 135 - 145 mmol/L    Potassium 4.2 3.4 - 5.3 mmol/L    Carbon Dioxide (CO2) 26 22 - 29 mmol/L    Anion Gap 11 7 - 15 mmol/L    Urea Nitrogen 17.3 6.0 - 20.0 mg/dL    Creatinine 0.78 0.51 - 0.95 mg/dL    GFR Estimate >90 >60 mL/min/1.73m2    Calcium 10.0 8.6 - 10.0 mg/dL    Chloride 97 (L) 98 - 107 mmol/L    Glucose 89 70 - 99 mg/dL    Alkaline Phosphatase 96 40 - 150 U/L    AST 30 0 - 45 U/L    ALT 35 0 - 50 U/L    Protein Total 7.8 6.4 - 8.3 g/dL    Albumin 4.7 3.5 - 5.2 g/dL    Bilirubin Total 0.7 <=1.2 mg/dL   CBC with platelets    Collection Time: 01/17/24  5:32 PM   Result Value Ref Range    WBC Count 6.2 4.0 - 11.0 10e3/uL    RBC Count 3.95 3.80 - 5.20 10e6/uL    Hemoglobin 13.9 11.7 - 15.7 g/dL    Hematocrit 38.9 35.0 - 47.0 %    MCV 99 78 - 100 fL    MCH 35.2 (H) 26.5 - 33.0 pg    MCHC 35.7 31.5 - 36.5 g/dL    RDW 12.4 10.0 - 15.0 %    Platelet Count 266 150 - 450 10e3/uL   Lipid Profile    Collection Time: 01/17/24  5:32 PM   Result Value Ref Range    Cholesterol 202 (H) <200 mg/dL    Triglycerides 59 <150 mg/dL    Direct Measure HDL 77 >=50 mg/dL    LDL Cholesterol Calculated 113 (H) <=100 mg/dL    Non HDL Cholesterol 125 <130 mg/dL    Patient Fasting > 8hrs? Yes    TSH with free T4 reflex    Collection Time: 01/17/24  5:32 PM   Result Value Ref Range    TSH 1.62 0.30 - 4.20 uIU/mL   Prolactin    Collection Time: 01/17/24  5:32 PM   Result Value Ref Range    Prolactin 11 5 - 23 ng/mL   FSH    Collection Time: 01/17/24  5:32 PM   Result Value Ref Range    .9 mIU/mL        ASSESSMENT/PLAN:   1. Preoperative examination  EKG obtained and shows normal sinus rhythm with ventricular rate of 94.  No ST or T wave abnormalities.   Labs are all normal/stable.  At this time patient is low risk for an intermediate risk surgery.  Recommend proceeding with no further testing needed.  - EKG 12-lead, tracing only  - CBC with platelets    2. Cystic acne  - Controlled.  Continue current regimen.    - clindamycin phos-benzoyl perox (ACANYA) 1.2-2.5 % external gel; Apply topically 2 times daily as needed (Acne)  Dispense: 50 g; Refill: 5    3. Eczema, unspecified type  - Controlled.  Continue current regimen.      4. Class 1 obesity due to excess calories without serious comorbidity with body mass index (BMI) of 31.0 to 31.9 in adult  We discussed today that I would like her to avoid combination use of phentermine and Ozempic.  She is having a hard time getting Ozempic covered so we will trial phentermine for 1 month while we wait for prior authorization.  She is to keep us updated on which medication she chooses to move forward with.    5. Cervical cancer screening  Declined today and we will plan to obtain at her follow-up in 6 months    6. Amenorrhea  -Patient appears to be postmenopausal.  She is to call if she has any new or changing symptoms.  - TSH with free T4 reflex  - Prolactin  - FSH    7. Obesity without serious comorbidity, unspecified classification, unspecified obesity type  See above  - semaglutide (OZEMPIC) 2 MG/3ML pen; Inject 0.5 mg Subcutaneous every 7 days  Dispense: 9 mL; Refill: 4    8. Screening for diabetes mellitus  - Comprehensive metabolic panel    9. Screening for hyperlipidemia  - Lipid Profile        Counseling  Reviewed preventive health counseling, as reflected in patient instructions        She reports that she quit smoking about 17 years ago. Her smoking use included cigarettes. She started smoking about 20 years ago. She has a 0.5 pack-year smoking history. She has never used smokeless tobacco.      Signed Electronically by: Amy Harrington, DO  Answers submitted by the patient for this visit:  Annual Preventive Visit  (Submitted on 1/17/2024)  Chief Complaint: Annual Exam:  Blood in stool: No  heartburn: No  peripheral edema: No  mood changes: No  Skin sensation changes: No  tenderness: No  breast mass: No  breast discharge: No

## 2024-01-18 LAB
ATRIAL RATE - MUSE: 94 BPM
DIASTOLIC BLOOD PRESSURE - MUSE: NORMAL MMHG
INTERPRETATION ECG - MUSE: NORMAL
P AXIS - MUSE: 20 DEGREES
PR INTERVAL - MUSE: 132 MS
QRS DURATION - MUSE: 80 MS
QT - MUSE: 342 MS
QTC - MUSE: 427 MS
R AXIS - MUSE: 22 DEGREES
SYSTOLIC BLOOD PRESSURE - MUSE: NORMAL MMHG
T AXIS - MUSE: 29 DEGREES
VENTRICULAR RATE- MUSE: 94 BPM

## 2024-01-18 RX ORDER — PHENTERMINE HYDROCHLORIDE 37.5 MG/1
37.5 TABLET ORAL
Qty: 30 TABLET | Refills: 0 | Status: SHIPPED | OUTPATIENT
Start: 2024-01-18 | End: 2024-04-08

## 2024-01-19 NOTE — TELEPHONE ENCOUNTER
"Fax information for surgery provided by patient.    \"The fax number for Dr. Krause is;     [phone]  Attn: Dr. Krause\"     Liana Mcgrath RN on 1/19/2024 at 11:05 AM    "

## 2024-01-19 NOTE — TELEPHONE ENCOUNTER
"Per Dr. Harrington's Lab Comments:    Dear Beth,  In follow up to your recent testing, results are all normal/stable from prior for the most part. The cholesterol is ok for now, work on diet and daily exercise/movement. The FSH menopause hormone definitely puts you in the \"post menopause\" category which explains what you are seeing/feeling. I will send in the phentermine for 1 month. Please send me the fax number for the surgery and call with any questions or concerns     Amy Harrington, DO       "

## 2024-02-28 ENCOUNTER — MYC REFILL (OUTPATIENT)
Dept: INTERNAL MEDICINE | Facility: OTHER | Age: 39
End: 2024-02-28
Payer: COMMERCIAL

## 2024-02-28 DIAGNOSIS — E66.811 CLASS 1 OBESITY DUE TO EXCESS CALORIES WITHOUT SERIOUS COMORBIDITY WITH BODY MASS INDEX (BMI) OF 31.0 TO 31.9 IN ADULT: ICD-10-CM

## 2024-02-28 DIAGNOSIS — E66.09 CLASS 1 OBESITY DUE TO EXCESS CALORIES WITHOUT SERIOUS COMORBIDITY WITH BODY MASS INDEX (BMI) OF 31.0 TO 31.9 IN ADULT: ICD-10-CM

## 2024-02-28 RX ORDER — PHENTERMINE HYDROCHLORIDE 37.5 MG/1
37.5 TABLET ORAL
Qty: 30 TABLET | Refills: 0 | Status: CANCELLED | OUTPATIENT
Start: 2024-02-28

## 2024-02-28 RX ORDER — PHENTERMINE HYDROCHLORIDE 37.5 MG/1
1 TABLET ORAL
Qty: 30 TABLET | OUTPATIENT
Start: 2024-02-28

## 2024-03-05 NOTE — TELEPHONE ENCOUNTER
"[facility] sent Rx request for the following:      Requested Prescriptions   Pending Prescriptions Disp Refills    phentermine (ADIPEX-P) 37.5 MG tablet 30 tablet 0     Sig: Take 1 tablet (37.5 mg) by mouth every morning (before breakfast)       There is no refill protocol information for this order          Last Prescription Date:   1/18/24  Last Fill Qty/Refills:         30, R-0    Last Office Visit:              1/17/24   Future Office visit:           none    Note from patient   \"Would like this refilled. Ozempic has been denied by insurance \"    Tiffany Márquez RN on 3/5/2024 at 4:03 PM      "

## 2024-03-08 NOTE — TELEPHONE ENCOUNTER
Patient comment: Would like this refilled. Ozempic has been denied by insurance     Left message on machine to call back to [phone].     Message sent to Pt via [employer]. Josi Myles RN .............. 3/8/2024  3:49 PM

## 2024-03-08 NOTE — TELEPHONE ENCOUNTER
Please clarify if patient is using Ozempic as I wanted to avoid use of both medications concurrently.  Thanks, SKH

## 2024-03-12 NOTE — TELEPHONE ENCOUNTER
3rd failed attempt to reach Pt. Pt has not read [employer]. Writer will close encounter, and follow up as appropriate. Josi Myles RN .............. 3/12/2024  10:27 AM

## 2024-04-08 ENCOUNTER — MYC REFILL (OUTPATIENT)
Dept: INTERNAL MEDICINE | Facility: OTHER | Age: 39
End: 2024-04-08
Payer: COMMERCIAL

## 2024-04-08 DIAGNOSIS — E66.09 CLASS 1 OBESITY DUE TO EXCESS CALORIES WITHOUT SERIOUS COMORBIDITY WITH BODY MASS INDEX (BMI) OF 31.0 TO 31.9 IN ADULT: ICD-10-CM

## 2024-04-08 DIAGNOSIS — E66.811 CLASS 1 OBESITY DUE TO EXCESS CALORIES WITHOUT SERIOUS COMORBIDITY WITH BODY MASS INDEX (BMI) OF 31.0 TO 31.9 IN ADULT: ICD-10-CM

## 2024-04-12 NOTE — TELEPHONE ENCOUNTER
Requested Prescriptions   Pending Prescriptions Disp Refills    phentermine (ADIPEX-P) 37.5 MG tablet 30 tablet 0     Sig: Take 1 tablet (37.5 mg) by mouth every morning (before breakfast)       There is no refill protocol information for this order        Last Prescription Date:   1/8/24  Last Fill Qty/Refills:         30, R-0    Last Office Visit:              1/17/24 (Dr. Harrington)  Future Office visit:           None    Per LOV note:    Return in about 6 months (around 7/17/2024) for Recheck weight, pap.    Unable to complete prescription refill per RN Medication Refill Policy.     Josi Myles RN .............. 4/12/2024  4:56 PM

## 2024-04-23 RX ORDER — PHENTERMINE HYDROCHLORIDE 37.5 MG/1
37.5 TABLET ORAL
Qty: 30 TABLET | Refills: 2 | Status: SHIPPED | OUTPATIENT
Start: 2024-04-23 | End: 2024-07-18

## 2024-07-14 DIAGNOSIS — E66.811 CLASS 1 OBESITY DUE TO EXCESS CALORIES WITHOUT SERIOUS COMORBIDITY WITH BODY MASS INDEX (BMI) OF 31.0 TO 31.9 IN ADULT: ICD-10-CM

## 2024-07-14 DIAGNOSIS — E66.09 CLASS 1 OBESITY DUE TO EXCESS CALORIES WITHOUT SERIOUS COMORBIDITY WITH BODY MASS INDEX (BMI) OF 31.0 TO 31.9 IN ADULT: ICD-10-CM

## 2024-07-18 RX ORDER — PHENTERMINE HYDROCHLORIDE 37.5 MG/1
1 TABLET ORAL
Qty: 30 TABLET | Refills: 0 | Status: SHIPPED | OUTPATIENT
Start: 2024-07-18 | End: 2024-08-16

## 2024-07-18 NOTE — TELEPHONE ENCOUNTER
phentermine (ADIPEX-P) 37.5 MG tablet     Sig: TAKE ONE TABLET BY MOUTH EVERY MORNING BEFORE BREAKFAST         Last Written Prescription Date:  4/23/24  Last Fill Quantity: 30,   # refills: 2  Last Office Visit: 1/17/24  Future Office visit:       Routing refill request to provider for review/approval because:  Drug not on the [facility], P or [facility] refill protocol or controlled substance  Per last refill patient is due for weight check and Pap this monthNeela Emmanuel RN on 7/18/2024 at 4:14 PM

## 2024-08-16 ENCOUNTER — OFFICE VISIT (OUTPATIENT)
Dept: INTERNAL MEDICINE | Facility: OTHER | Age: 39
End: 2024-08-16
Payer: COMMERCIAL

## 2024-08-16 VITALS
HEIGHT: 67 IN | SYSTOLIC BLOOD PRESSURE: 122 MMHG | DIASTOLIC BLOOD PRESSURE: 87 MMHG | HEART RATE: 100 BPM | OXYGEN SATURATION: 100 % | TEMPERATURE: 97.9 F | BODY MASS INDEX: 33.81 KG/M2 | WEIGHT: 215.4 LBS | RESPIRATION RATE: 16 BRPM

## 2024-08-16 DIAGNOSIS — E66.09 CLASS 1 OBESITY DUE TO EXCESS CALORIES WITHOUT SERIOUS COMORBIDITY WITH BODY MASS INDEX (BMI) OF 31.0 TO 31.9 IN ADULT: ICD-10-CM

## 2024-08-16 DIAGNOSIS — E66.9 OBESITY WITHOUT SERIOUS COMORBIDITY, UNSPECIFIED CLASSIFICATION, UNSPECIFIED OBESITY TYPE: ICD-10-CM

## 2024-08-16 DIAGNOSIS — E66.811 CLASS 1 OBESITY DUE TO EXCESS CALORIES WITHOUT SERIOUS COMORBIDITY WITH BODY MASS INDEX (BMI) OF 31.0 TO 31.9 IN ADULT: ICD-10-CM

## 2024-08-16 PROCEDURE — 99213 OFFICE O/P EST LOW 20 MIN: CPT

## 2024-08-16 RX ORDER — PHENTERMINE HYDROCHLORIDE 37.5 MG/1
1 TABLET ORAL DAILY
Qty: 30 TABLET | Refills: 0 | Status: SHIPPED | OUTPATIENT
Start: 2024-08-16 | End: 2024-09-18

## 2024-08-16 ASSESSMENT — PAIN SCALES - GENERAL: PAINLEVEL: NO PAIN (0)

## 2024-08-16 NOTE — PROGRESS NOTES
Subjective   Beth is a 39 year old, presenting for the following health issues:  Gyn Exam and Recheck Medication      8/16/2024     9:49 AM   Additional Questions   Roomed by Adwoa FERNÁNDEZ   Accompanied by self       ICD-10-CM    1. Obesity without serious comorbidity, unspecified classification, unspecified obesity type  E66.9 semaglutide (OZEMPIC) 2 MG/3ML pen      2. Class 1 obesity due to excess calories without serious comorbidity with body mass index (BMI) of 31.0 to 31.9 in adult  E66.09 phentermine (ADIPEX-P) 37.5 MG tablet    Z68.31         Beth presents to the clinic for a weight check and medication refill. She has been utilizing phentermine for weight loss. She was also on Ozempic for a period but states that her pharmacy stopped filling it. I encouraged her to reach out to her pharmacy/insurance to see the issue. I am happy to help in any way to get her on Ozempic. She has had minimal success on phentermine. She did have an ankle surgery in January and states that, that set her weight loss journey back. Otherwise tolerating the medication. I encouraged healthy diet and exercise in combination with medical weight loss. She is to continue with PCP appointment in January. Not due for labs today.   History of Present Illness       Reason for visit:  Med check    She eats 2-3 servings of fruits and vegetables daily.She consumes 0 sweetened beverage(s) daily.She exercises with enough effort to increase her heart rate 20 to 29 minutes per day.  She exercises with enough effort to increase her heart rate 5 days per week.   She is taking medications regularly.       Wt Readings from Last 4 Encounters:   08/16/24 97.7 kg (215 lb 6.4 oz)   01/17/24 97 kg (213 lb 12.8 oz)   06/27/22 88.5 kg (195 lb)   11/22/21 85.7 kg (189 lb)      Needs refills of meds  Apt for PAP, but she is seeing GYNE for this soon in [place].  Weight loss      Review of Systems  CONSTITUTIONAL: NEGATIVE for fever, chills, change in  "weight  INTEGUMENTARY/SKIN: NEGATIVE for worrisome rashes, moles or lesions  ENT/MOUTH: NEGATIVE for ear, mouth and throat problems  RESP: NEGATIVE for significant cough or SOB  CV: NEGATIVE for chest pain, palpitations or peripheral edema  GI: NEGATIVE for nausea, abdominal pain, heartburn, or change in bowel habits  : NEGATIVE for frequency, dysuria, or hematuria  MUSCULOSKELETAL: NEGATIVE for significant arthralgias or myalgia  NEURO: NEGATIVE for weakness, dizziness or paresthesias  ENDOCRINE: NEGATIVE for temperature intolerance, skin/hair changes  HEME: NEGATIVE for bleeding problems  PSYCHIATRIC: NEGATIVE for changes in mood or affect      Objective    /87 (BP Location: Right arm, Patient Position: Sitting, Cuff Size: Adult Regular)   Pulse 100   Temp 97.9  F (36.6  C) (Tympanic)   Resp 16   Ht 1.702 m (5' 7\")   Wt 97.7 kg (215 lb 6.4 oz)   LMP 06/05/2024 (Approximate)   SpO2 100%   Breastfeeding No   BMI 33.74 kg/m    Body mass index is 33.74 kg/m .  Physical Exam  Constitutional:       General: She is not in acute distress.     Appearance: Normal appearance. She is obese. She is not ill-appearing.   HENT:      Head: Normocephalic.   Cardiovascular:      Rate and Rhythm: Normal rate.      Pulses: Normal pulses.   Pulmonary:      Effort: Pulmonary effort is normal.   Skin:     General: Skin is warm and dry.   Neurological:      Mental Status: She is alert and oriented to person, place, and time.   Psychiatric:         Behavior: Behavior normal.          Signed Electronically by: VEE Cruz CNP    "

## 2024-08-16 NOTE — NURSING NOTE
"Chief Complaint   Patient presents with    Gyn Exam    Recheck Medication       Initial /87 (BP Location: Right arm, Patient Position: Sitting, Cuff Size: Adult Regular)   Pulse 100   Temp 97.9  F (36.6  C) (Tympanic)   Resp 16   Ht 1.702 m (5' 7\")   Wt 97.7 kg (215 lb 6.4 oz)   LMP 06/05/2024 (Approximate)   SpO2 100%   Breastfeeding No   BMI 33.74 kg/m   Estimated body mass index is 33.74 kg/m  as calculated from the following:    Height as of this encounter: 1.702 m (5' 7\").    Weight as of this encounter: 97.7 kg (215 lb 6.4 oz).  Medication Review: complete    The next two questions are to help us understand your food security.  If you are feeling you need any assistance in this area, we have resources available to support you today.          1/17/2024   SDOH- Food Insecurity   Within the past 12 months, did you worry that your food would run out before you got money to buy more? N   Within the past 12 months, did the food you bought just not last and you didn t have money to get more? N            Health Care Directive:  Patient does not have a Health Care Directive or Living Will: Discussed advance care planning with patient; however, patient declined at this time.    Adwoa Juan      "

## 2024-09-18 DIAGNOSIS — E66.09 CLASS 1 OBESITY DUE TO EXCESS CALORIES WITHOUT SERIOUS COMORBIDITY WITH BODY MASS INDEX (BMI) OF 31.0 TO 31.9 IN ADULT: ICD-10-CM

## 2024-09-18 DIAGNOSIS — E66.9 OBESITY WITHOUT SERIOUS COMORBIDITY, UNSPECIFIED CLASSIFICATION, UNSPECIFIED OBESITY TYPE: ICD-10-CM

## 2024-09-18 DIAGNOSIS — E66.811 CLASS 1 OBESITY DUE TO EXCESS CALORIES WITHOUT SERIOUS COMORBIDITY WITH BODY MASS INDEX (BMI) OF 31.0 TO 31.9 IN ADULT: ICD-10-CM

## 2024-09-19 NOTE — TELEPHONE ENCOUNTER
phentermine (ADIPEX-P) 37.5 MG tablet   Last Written Prescription Date:  8/16/24  Last Fill Quantity: 30,   # refills: 0  Last Office Visit: 8/16/24  Future Office visit:       Routing refill request to provider for review/approval because:  Drug not on the G, P or [facility] refill protocol or controlled substance. Unable to complete prescription refill per RNMedication Refill Policy.................... Miley Aguirre RN ....................  9/19/2024   4:17 PM

## 2024-09-20 RX ORDER — PHENTERMINE HYDROCHLORIDE 37.5 MG/1
1 TABLET ORAL DAILY
Qty: 30 TABLET | Refills: 0 | Status: SHIPPED | OUTPATIENT
Start: 2024-09-20

## 2024-09-20 NOTE — TELEPHONE ENCOUNTER
Does she need a dose adjustment? Please reach out to the patient and see what dose she has been on and for how long. Also, please ask her if she wants to increase the dose or remain at the current dose. She is ok to remain as long as she is losing weight.

## 2024-09-20 NOTE — TELEPHONE ENCOUNTER
[facility] sent Rx request for the following:      Requested Prescriptions   Pending Prescriptions Disp Refills    semaglutide (OZEMPIC) 2 MG/3ML pen 3 mL      Sig: Inject 0.25 mg subcutaneously every 7 days.       GLP-1 Agonists Protocol Failed - 9/18/2024  3:15 PM        Failed - HgbA1C in past 3 or 6 months     If HgbA1C is 8 or greater, it needs to be on file within the past 3 months.  If less than 8, must be on file within the past 6 months.     Recent Labs   Lab Test 04/28/21  1116   A1C 4.9             Failed - Recent (6 mo) or future (90 days) visit within the authorizing provider's specialty     The patient must have completed an in-person or virtual visit within the past 6 months or has a future visit scheduled within the next 90 days with the authorizing provider s specialty.  Urgent care and e-visits do not quality as an office visit for this protocol.          Failed - Medication indicated for associated diagnosis     Medication is associated with one or more of the following diagnoses:     Type 2 diabetes mellitus          Last Prescription Date:   Historical  Last Fill Qty/Refills:         , R-    Last Office Visit:              8/16/24   Future Office visit:           1/29/25    Routing refill request to provider for review/approval because:  Labs out of range:  A1C    Doretha Ross RN on 9/20/2024 at 10:04 AM

## 2024-10-15 ENCOUNTER — MYC REFILL (OUTPATIENT)
Dept: FAMILY MEDICINE | Facility: OTHER | Age: 39
End: 2024-10-15
Payer: COMMERCIAL

## 2024-10-15 DIAGNOSIS — E66.811 CLASS 1 OBESITY DUE TO EXCESS CALORIES WITHOUT SERIOUS COMORBIDITY WITH BODY MASS INDEX (BMI) OF 31.0 TO 31.9 IN ADULT: ICD-10-CM

## 2024-10-15 DIAGNOSIS — E66.09 CLASS 1 OBESITY DUE TO EXCESS CALORIES WITHOUT SERIOUS COMORBIDITY WITH BODY MASS INDEX (BMI) OF 31.0 TO 31.9 IN ADULT: ICD-10-CM

## 2024-10-21 ENCOUNTER — TELEPHONE (OUTPATIENT)
Dept: INTERNAL MEDICINE | Facility: OTHER | Age: 39
End: 2024-10-21
Payer: COMMERCIAL

## 2024-10-21 DIAGNOSIS — E66.09 CLASS 1 OBESITY DUE TO EXCESS CALORIES WITHOUT SERIOUS COMORBIDITY WITH BODY MASS INDEX (BMI) OF 31.0 TO 31.9 IN ADULT: Primary | ICD-10-CM

## 2024-10-21 DIAGNOSIS — E66.811 CLASS 1 OBESITY DUE TO EXCESS CALORIES WITHOUT SERIOUS COMORBIDITY WITH BODY MASS INDEX (BMI) OF 31.0 TO 31.9 IN ADULT: Primary | ICD-10-CM

## 2024-10-21 RX ORDER — PHENTERMINE HYDROCHLORIDE 37.5 MG/1
1 TABLET ORAL DAILY
Qty: 30 TABLET | Refills: 0 | Status: SHIPPED | OUTPATIENT
Start: 2024-10-21

## 2024-10-21 NOTE — TELEPHONE ENCOUNTER
Received PA denial from [employer] for Ozempic (0.25 or 0.5 MG/DOSE) 2MG/3ML pen-injectors..  Faxed the denial / appeal information to HIM.      Denied - Coverage is provided for the diagnosis of diabetes mellitus type 2. Coverage cannot be  authorized at this time.     Appeals- CaseId:[id number];  Appeal Information: Attention: CLINICAL APPEALS DEPARTMENT [employer] [address] [address],[place], [place],[address] Phone:[phone] Fax:[phone]

## 2024-10-21 NOTE — TELEPHONE ENCOUNTER
NICANORI.  As the pt does not have a diagnosis of Diabetes the Ozempic is not covered.  Neda Solorio LPN on 10/21/2024 at 2:31 PM

## 2024-10-21 NOTE — TELEPHONE ENCOUNTER
Attempted to call the pt.  There was no answer.  I left a message asking for a return call.  EXT [phone]  Neda Solorio LPN on 10/21/2024 at 3:29 PM

## 2024-10-21 NOTE — TELEPHONE ENCOUNTER
Patient comment: Can I get this filled until we can get Ozempic approved     Requested Prescriptions   Pending Prescriptions Disp Refills    phentermine (ADIPEX-P) 37.5 MG tablet 30 tablet 0     Sig: Take 1 tablet (37.5 mg) by mouth daily.       There is no refill protocol information for this order        Last Prescription Date:   9/20/24  Last Fill Qty/Refills:         30, R-0    Last Office Visit:              8/16/24 (Obesity discussed)   Future Office visit:             Future Appointments 10/21/2024 - 4/19/2025        Date Visit Type Length Department Provider     1/29/2025  9:20 AM PREVENTATIVE ADULT 20 min  INTERNAL MED Len, Amy GARY,     Location Instructions:     [facility] is located [address]                   Per LOV note:  Beth presents to the clinic for a weight check and medication refill. She has been utilizing phentermine for weight loss. She was also on Ozempic for a period but states that her pharmacy stopped filling it. I encouraged her to reach out to her pharmacy/insurance to see the issue. I am happy to help in any way to get her on Ozempic. She is to continue with PCP appointment in January. Not due for labs today.     Unable to complete prescription refill per RN Medication Refill Policy. Josi Myles RN .............. 10/21/2024  4:23 PM

## 2024-10-21 NOTE — TELEPHONE ENCOUNTER
I sent a new prescription for Wegovy to [facility] pharmacy.  Unsure if insurance will cover this or not.

## 2024-10-21 NOTE — TELEPHONE ENCOUNTER
Attempted to contact the pt.  There was no answer and just a generic voicemail so no message was left.  Neda Solorio LPN on 10/21/2024 at 2:30 PM

## 2024-10-23 NOTE — TELEPHONE ENCOUNTER
After proper verification patient was relayed message from below. Patient stated that she would wait to see if the Wegovy is covered and will let us know if it isn't. In the past Ozempic has been covered by [facility] pharmacy and she would like to try that if Wegovy is not covered.  Iris Merino LPN on 10/23/2024 at 10:03 AM  EXT. [phone]

## 2024-10-29 ENCOUNTER — MYC MEDICAL ADVICE (OUTPATIENT)
Dept: INTERNAL MEDICINE | Facility: OTHER | Age: 39
End: 2024-10-29
Payer: COMMERCIAL

## 2024-11-01 ENCOUNTER — TELEPHONE (OUTPATIENT)
Dept: INTERNAL MEDICINE | Facility: OTHER | Age: 39
End: 2024-11-01
Payer: COMMERCIAL

## 2024-11-01 NOTE — TELEPHONE ENCOUNTER
Wegovy 0.25MG/0.5ML auto-injectors is excluded from patients insurance plan.  I called [facility] to verify this information.

## 2024-11-01 NOTE — TELEPHONE ENCOUNTER
Called [facility] to ask them to send PA request for Wegovy 0.25.      Insurance pays $100   so the member is responsible for paying:  $1539/month.      No PA allowed.  Members are responsible for all co-pays.       Rx Bin:  [id number]  CARMELITAN: [id number]  ID: [id number]  Group: [id number]    Updated [facility] [place].     Cristin Godwin RN on 11/1/2024 at 9:42 AM      Update from [facility]:  I tried doing the PA on CMM's and got this message:  Your patient will pay 100% of a discounted price for this medication. Any amount the patient pays will not apply to their deductible or out-of-pocket expenses. I called [facility] and was notified this med is excluded from the plan.     Patient update on MyChart.    Cristin Godwin RN on 11/1/2024 at 3:08 PM

## 2024-11-01 NOTE — TELEPHONE ENCOUNTER
Pt was notified of this through a [employer] message by one of the Rns.  Neda Solorio LPN on 11/1/2024 at 3:47 PM

## 2024-11-01 NOTE — TELEPHONE ENCOUNTER
Reached out to [employer] to inquire about PA on 10/21/24  Wegovy 0.25 from [facility].     Patient update on [email or web address].    Cristin Godwin RN on 11/1/2024 at 9:15 AM

## 2025-01-22 DIAGNOSIS — E66.811 CLASS 1 OBESITY DUE TO EXCESS CALORIES WITHOUT SERIOUS COMORBIDITY WITH BODY MASS INDEX (BMI) OF 31.0 TO 31.9 IN ADULT: ICD-10-CM

## 2025-01-22 DIAGNOSIS — E66.09 CLASS 1 OBESITY DUE TO EXCESS CALORIES WITHOUT SERIOUS COMORBIDITY WITH BODY MASS INDEX (BMI) OF 31.0 TO 31.9 IN ADULT: ICD-10-CM

## 2025-01-23 RX ORDER — PHENTERMINE HYDROCHLORIDE 37.5 MG/1
1 TABLET ORAL DAILY
Qty: 30 TABLET | Refills: 1 | OUTPATIENT
Start: 2025-01-23

## 2025-01-23 NOTE — TELEPHONE ENCOUNTER
[facility] sent Rx request for the following:      Requested Prescriptions   Pending Prescriptions Disp Refills    phentermine (ADIPEX-P) 37.5 MG tablet 30 tablet 1     Sig: Take 1 tablet (37.5 mg) by mouth daily.       There is no refill protocol information for this order          Last Prescription Date:   11/22/24  Last Fill Qty/Refills:         30, R-1    Last Office Visit:              08/16/24 (Suly)   Future Office visit:             Next 5 appointments (look out 90 days)      Jan 29, 2025 9:20 AM  (Arrive by 9:05 AM)  Adult Preventative Visit with Amy Harrington DO  [facility] ([facility] [address]  [place] [id number] [phone]           Unable to complete prescription refill per RN Medication Refill Policy. Will route to prescribing provider for refill consideration.  Tammie Cartagena RN on 1/23/2025 at 3:08 PM

## 2025-01-23 NOTE — TELEPHONE ENCOUNTER
Patient needs to take a 3 month drug holiday. Dr. Harrington will discuss options at upcoming visit.

## 2025-02-05 ENCOUNTER — MYC REFILL (OUTPATIENT)
Dept: INTERNAL MEDICINE | Facility: OTHER | Age: 40
End: 2025-02-05
Payer: COMMERCIAL

## 2025-02-05 ENCOUNTER — MYC MEDICAL ADVICE (OUTPATIENT)
Dept: FAMILY MEDICINE | Facility: OTHER | Age: 40
End: 2025-02-05
Payer: COMMERCIAL

## 2025-02-05 DIAGNOSIS — E66.09 CLASS 1 OBESITY DUE TO EXCESS CALORIES WITHOUT SERIOUS COMORBIDITY WITH BODY MASS INDEX (BMI) OF 31.0 TO 31.9 IN ADULT: ICD-10-CM

## 2025-02-05 DIAGNOSIS — E66.811 CLASS 1 OBESITY DUE TO EXCESS CALORIES WITHOUT SERIOUS COMORBIDITY WITH BODY MASS INDEX (BMI) OF 31.0 TO 31.9 IN ADULT: ICD-10-CM

## 2025-02-05 RX ORDER — PHENTERMINE HYDROCHLORIDE 37.5 MG/1
1 TABLET ORAL DAILY
Qty: 30 TABLET | Refills: 1 | Status: CANCELLED | OUTPATIENT
Start: 2025-02-05

## 2025-02-05 NOTE — TELEPHONE ENCOUNTER
See [employer] message about this where patient is told she needs to take a 3 month drug holiday and needs to schedule to be seen.     Back and forth conversation in MyChart encounter from 2/5/25.      Refusing med refill.      Cristin Godwin RN on 2/5/2025 at 11:40 AM

## 2025-02-05 NOTE — TELEPHONE ENCOUNTER
She needs to be off of phentermine for a period of time (drug holiday). She had an appointment with Dr. Harrington but didn't show. Not sure what the appointment was for.

## 2025-02-05 NOTE — TELEPHONE ENCOUNTER
Patient has been offered an appt with you for Phentermine follow-up but in the medication denial- you state that she needs to be off Phentermine for 3 months.      If we schedule her with you- will she even be able to be prescribed phentermine if she has not taken 3 months drug holiday?       1/22/25 Refill Encounter:      Cristin Godwin RN on 2/5/2025 at 11:57 AM

## 2025-03-04 NOTE — TELEPHONE ENCOUNTER
[facility] sent Rx request for the following:      semaglutide (OZEMPIC) 2 MG/3ML pen 3 mL 1 2/28/2025 -- No   Sig - Route: Inject 0.25 mg subcutaneously every 7 days. - Subcutaneous   Sent to pharmacy as: Semaglutide(0.25 or 0.5MG/DOS) 2 MG/3ML Solution Pen-injector (OZEMPIC)   Class: E-Prescribe   Order: [id number]   E-Prescribing Status: Receipt confirmed by pharmacy (2/28/2025  8:53 AM CST)     [facility] - [address]       Doretha Ross RN on 3/4/2025 at 10:13 AM

## 2025-03-19 ENCOUNTER — MYC MEDICAL ADVICE (OUTPATIENT)
Dept: FAMILY MEDICINE | Facility: OTHER | Age: 40
End: 2025-03-19
Payer: COMMERCIAL

## 2025-03-19 DIAGNOSIS — E66.9 OBESITY WITHOUT SERIOUS COMORBIDITY, UNSPECIFIED CLASS, UNSPECIFIED OBESITY TYPE: Primary | Chronic | ICD-10-CM

## 2025-03-19 NOTE — TELEPHONE ENCOUNTER
Needs Ozempic Rx switched to Mounjaro due to insurance coverage.     Margarito'd up order. Please remove Ozempic from med list.     Routing to provider to review and respond.  Netta Esquivel RN on 3/19/2025 at 10:25 AM

## 2025-04-01 ENCOUNTER — MYC MEDICAL ADVICE (OUTPATIENT)
Dept: INTERNAL MEDICINE | Facility: OTHER | Age: 40
End: 2025-04-01
Payer: COMMERCIAL

## 2025-04-01 DIAGNOSIS — E66.9 OBESITY WITHOUT SERIOUS COMORBIDITY, UNSPECIFIED CLASS, UNSPECIFIED OBESITY TYPE: Primary | Chronic | ICD-10-CM

## 2025-04-01 NOTE — TELEPHONE ENCOUNTER
Denied for Ozempic and Mounjaro due to no DM II. Requesting order for Wegovy sent to [facility]. Will cover for weight loss and cardiovascular issues.     Has no dx r/t Cardiovascular disease or issues.     LOV 8/16/24    Margarito'd up order if you think appropriate.     Routing to provider to review and respond.  Netta Esquivel, RN on 4/1/2025 at 12:43 PM

## 2025-05-05 ENCOUNTER — MYC MEDICAL ADVICE (OUTPATIENT)
Dept: INTERNAL MEDICINE | Facility: OTHER | Age: 40
End: 2025-05-05
Payer: COMMERCIAL

## 2025-05-05 DIAGNOSIS — E66.09 CLASS 1 OBESITY DUE TO EXCESS CALORIES WITHOUT SERIOUS COMORBIDITY WITH BODY MASS INDEX (BMI) OF 31.0 TO 31.9 IN ADULT: ICD-10-CM

## 2025-05-05 DIAGNOSIS — E66.811 CLASS 1 OBESITY DUE TO EXCESS CALORIES WITHOUT SERIOUS COMORBIDITY WITH BODY MASS INDEX (BMI) OF 31.0 TO 31.9 IN ADULT: ICD-10-CM

## 2025-05-05 RX ORDER — PHENTERMINE HYDROCHLORIDE 37.5 MG/1
1 TABLET ORAL DAILY
Qty: 30 TABLET | Refills: 0 | Status: CANCELLED | OUTPATIENT
Start: 2025-05-05

## 2025-05-05 NOTE — TELEPHONE ENCOUNTER
Pt requesting rx for phentermine be sent in.       Last Rx was sent in on 11/22. Then started drug holiday.     Recommend OV.Will get scheduled with Raman or Steevnson.   Margarito'd up 1 month in case you are willing.     Netta Esquivel RN on 5/5/2025 at 12:11 PM

## 2025-05-17 ENCOUNTER — HEALTH MAINTENANCE LETTER (OUTPATIENT)
Age: 40
End: 2025-05-17